# Patient Record
Sex: MALE | Race: BLACK OR AFRICAN AMERICAN | NOT HISPANIC OR LATINO | Employment: UNEMPLOYED | URBAN - METROPOLITAN AREA
[De-identification: names, ages, dates, MRNs, and addresses within clinical notes are randomized per-mention and may not be internally consistent; named-entity substitution may affect disease eponyms.]

---

## 2017-05-16 ENCOUNTER — HISTORICAL (OUTPATIENT)
Dept: RADIOLOGY | Facility: HOSPITAL | Age: 47
End: 2017-05-16

## 2017-06-08 ENCOUNTER — HISTORICAL (OUTPATIENT)
Dept: LAB | Facility: HOSPITAL | Age: 47
End: 2017-06-08

## 2017-06-08 LAB
ABS NEUT (OLG): 3.55 X10(3)/MCL (ref 2.1–9.2)
ALBUMIN SERPL-MCNC: 4.1 GM/DL (ref 3.4–5)
ALBUMIN/GLOB SERPL: 1 RATIO (ref 1–2)
ALP SERPL-CCNC: 55 UNIT/L (ref 20–120)
ALT SERPL-CCNC: 12 UNIT/L
AST SERPL-CCNC: 20 UNIT/L
BASOPHILS # BLD AUTO: 0.02 X10(3)/MCL
BASOPHILS NFR BLD AUTO: 0 % (ref 0–1)
BILIRUB SERPL-MCNC: 0.7 MG/DL
BILIRUBIN DIRECT+TOT PNL SERPL-MCNC: 0.2 MG/DL
BILIRUBIN DIRECT+TOT PNL SERPL-MCNC: 0.5 MG/DL
BUN SERPL-MCNC: 17 MG/DL (ref 7–25)
CALCIUM SERPL-MCNC: 9.1 MG/DL (ref 8.4–10.3)
CD3+CD4+ CELLS # SPEC: 625 UNIT/L (ref 589–1505)
CD3+CD4+ CELLS NFR BLD: 26.3 % (ref 31–59)
CHLORIDE SERPL-SCNC: 102 MMOL/L (ref 96–110)
CO2 SERPL-SCNC: 30 MMOL/L (ref 24–32)
CREAT SERPL-MCNC: 0.96 MG/DL (ref 0.7–1.4)
EOSINOPHIL # BLD AUTO: 0.15 10*3/UL
EOSINOPHIL NFR BLD AUTO: 2 % (ref 0–5)
ERYTHROCYTE [DISTWIDTH] IN BLOOD BY AUTOMATED COUNT: 13 % (ref 11.5–14.5)
GLOBULIN SER-MCNC: 3.9 GM/ML (ref 2.3–3.5)
GLUCOSE SERPL-MCNC: 88 MG/DL (ref 65–99)
HCT VFR BLD AUTO: 36.2 % (ref 40–51)
HGB BLD-MCNC: 13.2 GM/DL (ref 13.5–17.5)
IMM GRANULOCYTES # BLD AUTO: 0.02 10*3/UL
IMM GRANULOCYTES NFR BLD AUTO: 0 %
LYMPHOCYTES # BLD AUTO: 2.35 X10(3)/MCL
LYMPHOCYTES # BLD AUTO: 2376 UNIT/L (ref 1260–5520)
LYMPHOCYTES NFR BLD AUTO: 36 % (ref 15–40)
LYMPHOCYTES NFR LN MANUAL: 36 % (ref 28–48)
LYMPHOMA - T-CELL MARKERS SPEC-IMP: ABNORMAL
MCH RBC QN AUTO: 40.5 PG (ref 26–34)
MCHC RBC AUTO-ENTMCNC: 36.5 GM/DL (ref 31–37)
MCV RBC AUTO: 111 FL (ref 80–100)
MONOCYTES # BLD AUTO: 0.5 X10(3)/MCL
MONOCYTES NFR BLD AUTO: 8 % (ref 4–12)
NEUTROPHILS # BLD AUTO: 3.55 X10(3)/MCL
NEUTROPHILS NFR BLD AUTO: 54 X10(3)/MCL
PLATELET # BLD AUTO: 199 X10(3)/MCL (ref 130–400)
PMV BLD AUTO: 10.2 FL (ref 7.4–10.4)
POTASSIUM SERPL-SCNC: 4.1 MMOL/L (ref 3.6–5.2)
PROT SERPL-MCNC: 8 GM/DL (ref 6–8)
RBC # BLD AUTO: 3.26 X10(6)/MCL (ref 4.5–5.9)
SODIUM SERPL-SCNC: 137 MMOL/L (ref 135–146)
WBC # BLD AUTO: 6600 /MM3 (ref 4500–11500)
WBC # SPEC AUTO: 6.6 X10(3)/MCL (ref 4.5–11)

## 2017-09-15 ENCOUNTER — HISTORICAL (OUTPATIENT)
Dept: ADMINISTRATIVE | Facility: HOSPITAL | Age: 47
End: 2017-09-15

## 2017-09-15 LAB
ABS NEUT (OLG): 3.01 X10(3)/MCL (ref 2.1–9.2)
ALBUMIN SERPL-MCNC: 3.9 GM/DL (ref 3.4–5)
ALBUMIN/GLOB SERPL: 1 RATIO (ref 1–2)
ALP SERPL-CCNC: 59 UNIT/L (ref 45–117)
ALT SERPL-CCNC: 14 UNIT/L (ref 12–78)
AST SERPL-CCNC: 16 UNIT/L (ref 15–37)
BASOPHILS # BLD AUTO: 0.03 X10(3)/MCL
BASOPHILS NFR BLD AUTO: 0 % (ref 0–1)
BILIRUB SERPL-MCNC: 0.5 MG/DL (ref 0.2–1)
BILIRUBIN DIRECT+TOT PNL SERPL-MCNC: 0.1 MG/DL
BILIRUBIN DIRECT+TOT PNL SERPL-MCNC: 0.4 MG/DL
BUN SERPL-MCNC: 10 MG/DL (ref 7–18)
CALCIUM SERPL-MCNC: 9.1 MG/DL (ref 8.5–10.1)
CD3+CD4+ CELLS # SPEC: ABNORMAL UNIT/L (ref 589–1505)
CD3+CD4+ CELLS NFR BLD: 29 % (ref 31–59)
CHLORIDE SERPL-SCNC: 101 MMOL/L (ref 98–107)
CO2 SERPL-SCNC: 31 MMOL/L (ref 21–32)
CREAT SERPL-MCNC: 1.1 MG/DL (ref 0.6–1.3)
EOSINOPHIL # BLD AUTO: 0.18 X10(3)/MCL
EOSINOPHIL NFR BLD AUTO: 3 % (ref 0–5)
ERYTHROCYTE [DISTWIDTH] IN BLOOD BY AUTOMATED COUNT: 11.9 % (ref 11.5–14.5)
GLOBULIN SER-MCNC: 4.3 GM/ML (ref 2.3–3.5)
GLUCOSE SERPL-MCNC: 83 MG/DL (ref 74–106)
HCT VFR BLD AUTO: 35.7 % (ref 40–51)
HGB BLD-MCNC: 13.1 GM/DL (ref 13.5–17.5)
LYMPHOCYTES # BLD AUTO: 2.46 X10(3)/MCL
LYMPHOCYTES # BLD AUTO: 2480 UNIT/L (ref 1260–5520)
LYMPHOCYTES NFR BLD AUTO: 40 % (ref 15–40)
LYMPHOCYTES NFR LN MANUAL: 40 % (ref 28–48)
LYMPHOMA - T-CELL MARKERS SPEC-IMP: ABNORMAL
MCH RBC QN AUTO: 40.9 PG (ref 26–34)
MCHC RBC AUTO-ENTMCNC: 36.7 GM/DL (ref 31–37)
MCV RBC AUTO: 111.6 FL (ref 80–100)
MONOCYTES # BLD AUTO: 0.55 X10(3)/MCL
MONOCYTES NFR BLD AUTO: 9 % (ref 4–12)
NEUTROPHILS # BLD AUTO: 3.01 X10(3)/MCL
NEUTROPHILS NFR BLD AUTO: 48 X10(3)/MCL
PLATELET # BLD AUTO: 199 X10(3)/MCL (ref 130–400)
PMV BLD AUTO: 10.4 FL (ref 7.4–10.4)
POTASSIUM SERPL-SCNC: 4.3 MMOL/L (ref 3.5–5.1)
PROT SERPL-MCNC: 8.2 GM/DL (ref 6.4–8.2)
RBC # BLD AUTO: 3.2 X10(6)/MCL (ref 4.5–5.9)
SODIUM SERPL-SCNC: 139 MMOL/L (ref 136–145)
WBC # BLD AUTO: 6200 /MM3 (ref 4500–11500)
WBC # SPEC AUTO: 6.2 X10(3)/MCL (ref 4.5–11)

## 2017-10-10 ENCOUNTER — HISTORICAL (OUTPATIENT)
Dept: ADMINISTRATIVE | Facility: HOSPITAL | Age: 47
End: 2017-10-10

## 2017-10-10 LAB
CHOLEST SERPL-MCNC: 168 MG/DL
CHOLEST/HDLC SERPL: 2.6 {RATIO} (ref 0–5)
HDLC SERPL-MCNC: 64 MG/DL
LDLC SERPL CALC-MCNC: 68 MG/DL (ref 0–130)
TRIGL SERPL-MCNC: 181 MG/DL
VLDLC SERPL CALC-MCNC: 36 MG/DL

## 2018-03-01 ENCOUNTER — HISTORICAL (OUTPATIENT)
Dept: ADMINISTRATIVE | Facility: HOSPITAL | Age: 48
End: 2018-03-01

## 2018-03-01 LAB
ABS NEUT (OLG): 2.42 X10(3)/MCL (ref 2.1–9.2)
ABS NEUT (OLG): 2.48 X10(3)/MCL (ref 2.1–9.2)
ALBUMIN SERPL-MCNC: 3.8 GM/DL (ref 3.4–5)
ALBUMIN/GLOB SERPL: 1 RATIO (ref 1–2)
ALP SERPL-CCNC: 55 UNIT/L (ref 45–117)
ALT SERPL-CCNC: 16 UNIT/L (ref 12–78)
APPEARANCE, UA: CLEAR
AST SERPL-CCNC: 17 UNIT/L (ref 15–37)
BACTERIA #/AREA URNS AUTO: ABNORMAL /[HPF]
BASOPHILS # BLD AUTO: 0.01 X10(3)/MCL
BASOPHILS # BLD AUTO: 0.02 X10(3)/MCL
BASOPHILS NFR BLD AUTO: 0 %
BASOPHILS NFR BLD AUTO: 0 %
BILIRUB SERPL-MCNC: 0.4 MG/DL (ref 0.2–1)
BILIRUB UR QL STRIP: NEGATIVE
BILIRUBIN DIRECT+TOT PNL SERPL-MCNC: 0.1 MG/DL
BILIRUBIN DIRECT+TOT PNL SERPL-MCNC: 0.3 MG/DL
BUN SERPL-MCNC: 15 MG/DL (ref 7–18)
CALCIUM SERPL-MCNC: 9.1 MG/DL (ref 8.5–10.1)
CD3+CD4+ CELLS # SPEC: 570 UNIT/L (ref 589–1505)
CD3+CD4+ CELLS NFR BLD: 28.5 % (ref 31–59)
CHLORIDE SERPL-SCNC: 104 MMOL/L (ref 98–107)
CHOLEST SERPL-MCNC: 167 MG/DL
CHOLEST/HDLC SERPL: 2.3 {RATIO} (ref 0–5)
CO2 SERPL-SCNC: 30 MMOL/L (ref 21–32)
COLOR UR: YELLOW
CREAT SERPL-MCNC: 1.2 MG/DL (ref 0.6–1.3)
DEPRECATED CALCIDIOL+CALCIFEROL SERPL-MC: 85.07 NG/ML (ref 30–80)
EOSINOPHIL # BLD AUTO: 0.12 10*3/UL
EOSINOPHIL # BLD AUTO: 0.12 X10(3)/MCL
EOSINOPHIL NFR BLD AUTO: 2 %
EOSINOPHIL NFR BLD AUTO: 2 %
ERYTHROCYTE [DISTWIDTH] IN BLOOD BY AUTOMATED COUNT: 13 % (ref 11.5–14.5)
ERYTHROCYTE [DISTWIDTH] IN BLOOD BY AUTOMATED COUNT: 13 % (ref 11.5–14.5)
GLOBULIN SER-MCNC: 4.3 GM/ML (ref 2.3–3.5)
GLUCOSE (UA): NORMAL
GLUCOSE SERPL-MCNC: 87 MG/DL (ref 74–106)
HCT VFR BLD AUTO: 34.5 % (ref 40–51)
HCT VFR BLD AUTO: 34.9 % (ref 40–51)
HDLC SERPL-MCNC: 73 MG/DL
HGB BLD-MCNC: 12.8 GM/DL (ref 13.5–17.5)
HGB BLD-MCNC: 12.9 GM/DL (ref 13.5–17.5)
HGB UR QL STRIP: NEGATIVE
HYALINE CASTS #/AREA URNS LPF: ABNORMAL /[LPF]
IMM GRANULOCYTES # BLD AUTO: 0.01 10*3/UL
IMM GRANULOCYTES # BLD AUTO: 0.01 10*3/UL
IMM GRANULOCYTES NFR BLD AUTO: 0 %
IMM GRANULOCYTES NFR BLD AUTO: 0 %
KETONES UR QL STRIP: NEGATIVE
LDLC SERPL CALC-MCNC: 76 MG/DL (ref 0–130)
LEUKOCYTE ESTERASE UR QL STRIP: NEGATIVE
LYMPHOCYTES # BLD AUTO: 1.98 X10(3)/MCL
LYMPHOCYTES # BLD AUTO: 2.04 X10(3)/MCL
LYMPHOCYTES # BLD AUTO: 2000 UNIT/L (ref 1260–5520)
LYMPHOCYTES NFR BLD AUTO: 40 % (ref 13–40)
LYMPHOCYTES NFR BLD AUTO: 40 % (ref 13–40)
LYMPHOCYTES NFR LN MANUAL: 40 % (ref 28–48)
LYMPHOMA - T-CELL MARKERS SPEC-IMP: ABNORMAL
MCH RBC QN AUTO: 42.5 PG (ref 26–34)
MCH RBC QN AUTO: 43.4 PG (ref 26–34)
MCHC RBC AUTO-ENTMCNC: 36.7 GM/DL (ref 31–37)
MCHC RBC AUTO-ENTMCNC: 37.4 GM/DL (ref 31–37)
MCV RBC AUTO: 115.9 FL (ref 80–100)
MCV RBC AUTO: 116.2 FL (ref 80–100)
MONOCYTES # BLD AUTO: 0.41 X10(3)/MCL
MONOCYTES # BLD AUTO: 0.43 X10(3)/MCL
MONOCYTES NFR BLD AUTO: 8 % (ref 4–12)
MONOCYTES NFR BLD AUTO: 9 % (ref 4–12)
NEG CONT SPOT COUNT: 0
NEUTROPHILS # BLD AUTO: 2.42 X10(3)/MCL
NEUTROPHILS # BLD AUTO: 2.48 X10(3)/MCL
NEUTROPHILS NFR BLD AUTO: 49 X10(3)/MCL
NEUTROPHILS NFR BLD AUTO: 49 X10(3)/MCL
NITRITE UR QL STRIP: NEGATIVE
PANEL A SPOT COUNT: 0
PANEL B SPOT COUNT: 0
PH UR STRIP: 7 [PH] (ref 4.5–8)
PLATELET # BLD AUTO: 199 X10(3)/MCL (ref 130–400)
PLATELET # BLD AUTO: 205 X10(3)/MCL (ref 130–400)
PMV BLD AUTO: 10.5 FL (ref 7.4–10.4)
PMV BLD AUTO: 10.5 FL (ref 7.4–10.4)
POS CONT SPOT COUNT: >20
POTASSIUM SERPL-SCNC: 4.2 MMOL/L (ref 3.5–5.1)
PROT SERPL-MCNC: 8.1 GM/DL (ref 6.4–8.2)
PROT UR QL STRIP: NEGATIVE
RBC # BLD AUTO: 2.97 X10(6)/MCL (ref 4.5–5.9)
RBC # BLD AUTO: 3.01 X10(6)/MCL (ref 4.5–5.9)
RBC #/AREA URNS AUTO: ABNORMAL /[HPF]
RPR SER QL: NORMAL
SODIUM SERPL-SCNC: 139 MMOL/L (ref 136–145)
SP GR UR STRIP: 1.01 (ref 1–1.03)
SQUAMOUS #/AREA URNS LPF: ABNORMAL /[LPF]
T PALLIDUM AB SER QL: REACTIVE
T-SPOT.TB: NEGATIVE
TRIGL SERPL-MCNC: 90 MG/DL
TSH SERPL-ACNC: 1.86 MIU/L (ref 0.36–3.74)
UROBILINOGEN UR STRIP-ACNC: NORMAL
VLDLC SERPL CALC-MCNC: 18 MG/DL
WBC # BLD AUTO: 5000 /MM3 (ref 4500–11500)
WBC # SPEC AUTO: 5 X10(3)/MCL (ref 4.5–11)
WBC # SPEC AUTO: 5.1 X10(3)/MCL (ref 4.5–11)
WBC #/AREA URNS AUTO: ABNORMAL /HPF

## 2018-10-03 ENCOUNTER — HISTORICAL (OUTPATIENT)
Dept: ADMINISTRATIVE | Facility: HOSPITAL | Age: 48
End: 2018-10-03

## 2018-10-03 LAB
ABS NEUT (OLG): 2.21 X10(3)/MCL
ALBUMIN SERPL-MCNC: 3.8 GM/DL (ref 3.4–5)
ALBUMIN/GLOB SERPL: 1 RATIO (ref 1–2)
ALP SERPL-CCNC: 63 UNIT/L (ref 45–117)
ALT SERPL-CCNC: 15 UNIT/L (ref 12–78)
AST SERPL-CCNC: 17 UNIT/L (ref 15–37)
BASOPHILS # BLD AUTO: 0.02 X10(3)/MCL
BASOPHILS NFR BLD AUTO: 0 %
BILIRUB SERPL-MCNC: 0.5 MG/DL (ref 0.2–1)
BILIRUBIN DIRECT+TOT PNL SERPL-MCNC: 0.1 MG/DL
BILIRUBIN DIRECT+TOT PNL SERPL-MCNC: 0.4 MG/DL
BUN SERPL-MCNC: 13 MG/DL (ref 7–18)
CALCIUM SERPL-MCNC: 9 MG/DL (ref 8.5–10.1)
CD3+CD4+ CELLS # SPEC: 559 UNIT/L (ref 589–1505)
CD3+CD4+ CELLS NFR BLD: 26.6 % (ref 31–59)
CHLORIDE SERPL-SCNC: 107 MMOL/L (ref 98–107)
CO2 SERPL-SCNC: 26 MMOL/L (ref 21–32)
CREAT SERPL-MCNC: 1.3 MG/DL (ref 0.6–1.3)
EOSINOPHIL # BLD AUTO: 0.22 X10(3)/MCL
EOSINOPHIL NFR BLD AUTO: 4 %
ERYTHROCYTE [DISTWIDTH] IN BLOOD BY AUTOMATED COUNT: 12.2 % (ref 11.5–14.5)
GLOBULIN SER-MCNC: 4.6 GM/ML (ref 2.3–3.5)
GLUCOSE SERPL-MCNC: 98 MG/DL (ref 74–106)
HCT VFR BLD AUTO: 35.6 % (ref 40–51)
HGB BLD-MCNC: 13.3 GM/DL (ref 13.5–17.5)
IMM GRANULOCYTES # BLD AUTO: 0.01 10*3/UL
IMM GRANULOCYTES NFR BLD AUTO: 0 %
LYMPHOCYTES # BLD AUTO: 2.1 X10(3)/MCL
LYMPHOCYTES # BLD AUTO: 2100 UNIT/L (ref 1260–5520)
LYMPHOCYTES NFR BLD AUTO: 42 % (ref 13–40)
LYMPHOCYTES NFR LN MANUAL: 42 % (ref 28–48)
LYMPHOMA - T-CELL MARKERS SPEC-IMP: ABNORMAL
MACROCYTES BLD QL SMEAR: NORMAL
MCH RBC QN AUTO: 42.2 PG (ref 26–34)
MCHC RBC AUTO-ENTMCNC: 37.4 GM/DL (ref 31–37)
MCV RBC AUTO: 113 FL (ref 80–100)
MONOCYTES # BLD AUTO: 0.42 X10(3)/MCL
MONOCYTES NFR BLD AUTO: 8 % (ref 4–12)
NEUTROPHILS # BLD AUTO: 2.21 X10(3)/MCL
NEUTROPHILS NFR BLD AUTO: 44 %
PLATELET # BLD AUTO: 205 X10(3)/MCL (ref 130–400)
PLATELET # BLD EST: ADEQUATE 10*3/UL
PMV BLD AUTO: 10 FL (ref 7.4–10.4)
POTASSIUM SERPL-SCNC: 3.7 MMOL/L (ref 3.5–5.1)
PROT SERPL-MCNC: 8.4 GM/DL (ref 6.4–8.2)
RBC # BLD AUTO: 3.15 X10(6)/MCL (ref 4.5–5.9)
RBC MORPH BLD: NORMAL
SODIUM SERPL-SCNC: 139 MMOL/L (ref 136–145)
SPHEROCYTES BLD QL SMEAR: NORMAL
TARGETS BLD QL SMEAR: NORMAL
WBC # BLD AUTO: 5000 /MM3 (ref 4500–11500)
WBC # SPEC AUTO: 5 X10(3)/MCL (ref 4.5–11)

## 2018-11-01 ENCOUNTER — HISTORICAL (OUTPATIENT)
Dept: ADMINISTRATIVE | Facility: HOSPITAL | Age: 48
End: 2018-11-01

## 2018-11-01 LAB
ABS NEUT (OLG): 4.22 X10(3)/MCL (ref 2.1–9.2)
ALBUMIN SERPL-MCNC: 3.6 GM/DL (ref 3.4–5)
ALBUMIN/GLOB SERPL: 1 RATIO (ref 1–2)
ALP SERPL-CCNC: 69 UNIT/L (ref 45–117)
ALT SERPL-CCNC: 17 UNIT/L (ref 12–78)
AST SERPL-CCNC: 15 UNIT/L (ref 15–37)
BASOPHILS # BLD AUTO: 0.01 X10(3)/MCL
BASOPHILS NFR BLD AUTO: 0 %
BILIRUB SERPL-MCNC: 0.3 MG/DL (ref 0.2–1)
BILIRUBIN DIRECT+TOT PNL SERPL-MCNC: 0.1 MG/DL
BILIRUBIN DIRECT+TOT PNL SERPL-MCNC: 0.2 MG/DL
BUN SERPL-MCNC: 18 MG/DL (ref 7–18)
CALCIUM SERPL-MCNC: 8.7 MG/DL (ref 8.5–10.1)
CHLORIDE SERPL-SCNC: 106 MMOL/L (ref 98–107)
CK MB SERPL-MCNC: 2.3 NG/ML (ref 1–3.6)
CK SERPL-CCNC: 228 UNIT/L (ref 39–308)
CO2 SERPL-SCNC: 28 MMOL/L (ref 21–32)
CREAT SERPL-MCNC: 1.1 MG/DL (ref 0.6–1.3)
DEPRECATED CALCIDIOL+CALCIFEROL SERPL-MC: 25.76 NG/ML (ref 30–80)
EOSINOPHIL # BLD AUTO: 0.22 10*3/UL
EOSINOPHIL NFR BLD AUTO: 3 %
ERYTHROCYTE [DISTWIDTH] IN BLOOD BY AUTOMATED COUNT: 12 % (ref 11.5–14.5)
GLOBULIN SER-MCNC: 4.5 GM/ML (ref 2.3–3.5)
GLUCOSE SERPL-MCNC: 77 MG/DL (ref 74–106)
HCT VFR BLD AUTO: 33.9 % (ref 40–51)
HGB BLD-MCNC: 12.5 GM/DL (ref 13.5–17.5)
IMM GRANULOCYTES # BLD AUTO: 0.01 10*3/UL
IMM GRANULOCYTES NFR BLD AUTO: 0 %
LYMPHOCYTES # BLD AUTO: 2.11 X10(3)/MCL
LYMPHOCYTES NFR BLD AUTO: 30 % (ref 13–40)
MCH RBC QN AUTO: 42.2 PG (ref 26–34)
MCHC RBC AUTO-ENTMCNC: 36.9 GM/DL (ref 31–37)
MCV RBC AUTO: 114.5 FL (ref 80–100)
MONOCYTES # BLD AUTO: 0.55 X10(3)/MCL
MONOCYTES NFR BLD AUTO: 8 % (ref 4–12)
NEUTROPHILS # BLD AUTO: 4.22 X10(3)/MCL
NEUTROPHILS NFR BLD AUTO: 59 X10(3)/MCL
PLATELET # BLD AUTO: 201 X10(3)/MCL (ref 130–400)
PMV BLD AUTO: 10 FL (ref 7.4–10.4)
POTASSIUM SERPL-SCNC: 3.9 MMOL/L (ref 3.5–5.1)
PROT SERPL-MCNC: 8.1 GM/DL (ref 6.4–8.2)
RBC # BLD AUTO: 2.96 X10(6)/MCL (ref 4.5–5.9)
SODIUM SERPL-SCNC: 140 MMOL/L (ref 136–145)
TROPONIN I SERPL-MCNC: <0.015 NG/ML (ref 0–0.05)
WBC # SPEC AUTO: 7.1 X10(3)/MCL (ref 4.5–11)

## 2018-11-02 ENCOUNTER — HISTORICAL (OUTPATIENT)
Dept: ADMINISTRATIVE | Facility: HOSPITAL | Age: 48
End: 2018-11-02

## 2019-01-23 ENCOUNTER — HISTORICAL (OUTPATIENT)
Dept: RADIOLOGY | Facility: HOSPITAL | Age: 49
End: 2019-01-23

## 2019-01-28 ENCOUNTER — HISTORICAL (OUTPATIENT)
Dept: ADMINISTRATIVE | Facility: HOSPITAL | Age: 49
End: 2019-01-28

## 2019-01-28 LAB
ABS NEUT (OLG): 1.88 X10(3)/MCL (ref 2.1–9.2)
ALBUMIN SERPL-MCNC: 4 GM/DL (ref 3.4–5)
ALBUMIN/GLOB SERPL: 0.9 RATIO (ref 1.1–2)
ALP SERPL-CCNC: 80 UNIT/L (ref 45–117)
ALT SERPL-CCNC: 15 UNIT/L (ref 12–78)
AST SERPL-CCNC: 18 UNIT/L (ref 15–37)
BASOPHILS # BLD AUTO: 0.01 X10(3)/MCL
BASOPHILS NFR BLD AUTO: 0 %
BILIRUB SERPL-MCNC: 0.9 MG/DL (ref 0.2–1)
BILIRUBIN DIRECT+TOT PNL SERPL-MCNC: 0.2 MG/DL
BILIRUBIN DIRECT+TOT PNL SERPL-MCNC: 0.7 MG/DL
BUN SERPL-MCNC: 11 MG/DL (ref 7–18)
CALCIUM SERPL-MCNC: 8.9 MG/DL (ref 8.5–10.1)
CD3+CD4+ CELLS # SPEC: 676 UNIT/L (ref 589–1505)
CD3+CD4+ CELLS NFR BLD: 33.4 % (ref 31–59)
CHLORIDE SERPL-SCNC: 102 MMOL/L (ref 98–107)
CO2 SERPL-SCNC: 29 MMOL/L (ref 21–32)
CREAT SERPL-MCNC: 1.3 MG/DL (ref 0.6–1.3)
DEPRECATED CALCIDIOL+CALCIFEROL SERPL-MC: 22.75 NG/ML (ref 30–80)
EOSINOPHIL # BLD AUTO: 0.12 10*3/UL
EOSINOPHIL NFR BLD AUTO: 3 %
ERYTHROCYTE [DISTWIDTH] IN BLOOD BY AUTOMATED COUNT: 12.7 % (ref 11.5–14.5)
GLOBULIN SER-MCNC: 4.4 GM/ML (ref 2.3–3.5)
GLUCOSE SERPL-MCNC: 115 MG/DL (ref 74–106)
HCT VFR BLD AUTO: 37.8 % (ref 40–51)
HGB BLD-MCNC: 13.8 GM/DL (ref 13.5–17.5)
LYMPHOCYTES # BLD AUTO: 2.03 X10(3)/MCL
LYMPHOCYTES # BLD AUTO: 2024 UNIT/L (ref 1260–5520)
LYMPHOCYTES NFR BLD AUTO: 46 % (ref 13–40)
LYMPHOCYTES NFR LN MANUAL: 46 % (ref 28–48)
LYMPHOMA - T-CELL MARKERS SPEC-IMP: ABNORMAL
MCH RBC QN AUTO: 41.8 PG (ref 26–34)
MCHC RBC AUTO-ENTMCNC: 36.5 GM/DL (ref 31–37)
MCV RBC AUTO: 114.5 FL (ref 80–100)
MONOCYTES # BLD AUTO: 0.35 X10(3)/MCL
MONOCYTES NFR BLD AUTO: 8 % (ref 4–12)
NEUTROPHILS # BLD AUTO: 1.88 X10(3)/MCL
NEUTROPHILS NFR BLD AUTO: 43 X10(3)/MCL
PLATELET # BLD AUTO: 178 X10(3)/MCL (ref 130–400)
PMV BLD AUTO: 10.6 FL (ref 7.4–10.4)
POTASSIUM SERPL-SCNC: 4 MMOL/L (ref 3.5–5.1)
PROT SERPL-MCNC: 8.4 GM/DL (ref 6.4–8.2)
RBC # BLD AUTO: 3.3 X10(6)/MCL (ref 4.5–5.9)
SODIUM SERPL-SCNC: 135 MMOL/L (ref 136–145)
WBC # BLD AUTO: 4400 /MM3 (ref 4500–11500)
WBC # SPEC AUTO: 4.4 X10(3)/MCL (ref 4.5–11)

## 2019-02-12 ENCOUNTER — HISTORICAL (OUTPATIENT)
Dept: RADIOLOGY | Facility: HOSPITAL | Age: 49
End: 2019-02-12

## 2019-04-30 ENCOUNTER — HISTORICAL (OUTPATIENT)
Dept: CARDIOLOGY | Facility: HOSPITAL | Age: 49
End: 2019-04-30

## 2019-05-29 ENCOUNTER — HISTORICAL (OUTPATIENT)
Dept: ADMINISTRATIVE | Facility: HOSPITAL | Age: 49
End: 2019-05-29

## 2019-05-29 LAB
ABS NEUT (OLG): 2.92 X10(3)/MCL (ref 2.1–9.2)
ALBUMIN SERPL-MCNC: 3.8 GM/DL (ref 3.4–5)
ALBUMIN/GLOB SERPL: 0.9 RATIO (ref 1.1–2)
ALP SERPL-CCNC: 71 UNIT/L (ref 45–117)
ALT SERPL-CCNC: 14 UNIT/L (ref 12–78)
AST SERPL-CCNC: 19 UNIT/L (ref 15–37)
BASOPHILS # BLD AUTO: 0.03 X10(3)/MCL
BASOPHILS NFR BLD AUTO: 1 %
BILIRUB SERPL-MCNC: 0.5 MG/DL (ref 0.2–1)
BILIRUBIN DIRECT+TOT PNL SERPL-MCNC: 0.2 MG/DL
BILIRUBIN DIRECT+TOT PNL SERPL-MCNC: 0.3 MG/DL
BUN SERPL-MCNC: 10 MG/DL (ref 7–18)
CALCIUM SERPL-MCNC: 9 MG/DL (ref 8.5–10.1)
CD3+CD4+ CELLS # SPEC: 418 UNIT/L (ref 589–1505)
CD3+CD4+ CELLS NFR BLD: 34.1 % (ref 31–59)
CHLORIDE SERPL-SCNC: 109 MMOL/L (ref 98–107)
CHOLEST SERPL-MCNC: 168 MG/DL
CHOLEST/HDLC SERPL: 2.7 {RATIO} (ref 0–5)
CO2 SERPL-SCNC: 28 MMOL/L (ref 21–32)
CREAT SERPL-MCNC: 1.2 MG/DL (ref 0.6–1.3)
CRP SERPL-MCNC: <0.3 MG/DL
DEPRECATED CALCIDIOL+CALCIFEROL SERPL-MC: 73.28 NG/ML (ref 30–80)
EOSINOPHIL # BLD AUTO: 0.31 X10(3)/MCL
EOSINOPHIL NFR BLD AUTO: 6 %
ERYTHROCYTE [DISTWIDTH] IN BLOOD BY AUTOMATED COUNT: 12.1 % (ref 11.5–14.5)
ERYTHROCYTE [SEDIMENTATION RATE] IN BLOOD: 13 MM/HR (ref 0–15)
EST. AVERAGE GLUCOSE BLD GHB EST-MCNC: 91 MG/DL
GLOBULIN SER-MCNC: 4.2 GM/ML (ref 2.3–3.5)
GLUCOSE SERPL-MCNC: 67 MG/DL (ref 74–106)
HBA1C MFR BLD: 4.8 % (ref 4.2–6.3)
HCT VFR BLD AUTO: 35.3 % (ref 40–51)
HCV AB SERPL QL IA: NONREACTIVE
HDLC SERPL-MCNC: 63 MG/DL
HGB BLD-MCNC: 13.2 GM/DL (ref 13.5–17.5)
IMM GRANULOCYTES # BLD AUTO: 0.01 10*3/UL
IMM GRANULOCYTES NFR BLD AUTO: 0 %
LDLC SERPL CALC-MCNC: 72 MG/DL (ref 0–130)
LYMPHOCYTES # BLD AUTO: 1.21 X10(3)/MCL
LYMPHOCYTES # BLD AUTO: 1225 UNIT/L (ref 1260–5520)
LYMPHOCYTES NFR BLD AUTO: 25 % (ref 13–40)
LYMPHOCYTES NFR LN MANUAL: 25 % (ref 28–48)
LYMPHOMA - T-CELL MARKERS SPEC-IMP: ABNORMAL
MCH RBC QN AUTO: 43.4 PG (ref 26–34)
MCHC RBC AUTO-ENTMCNC: 37.4 GM/DL (ref 31–37)
MCV RBC AUTO: 116.1 FL (ref 80–100)
MONOCYTES # BLD AUTO: 0.4 X10(3)/MCL
MONOCYTES NFR BLD AUTO: 8 % (ref 4–12)
NEG CONT SPOT COUNT: NORMAL
NEUTROPHILS # BLD AUTO: 2.92 X10(3)/MCL
NEUTROPHILS NFR BLD AUTO: 60 X10(3)/MCL
PANEL A SPOT COUNT: 0
PANEL B SPOT COUNT: 0
PLATELET # BLD AUTO: 181 X10(3)/MCL (ref 130–400)
PMV BLD AUTO: 10.5 FL (ref 7.4–10.4)
POS CONT SPOT COUNT: NORMAL
POTASSIUM SERPL-SCNC: 4 MMOL/L (ref 3.5–5.1)
PROT SERPL-MCNC: 8 GM/DL (ref 6.4–8.2)
RBC # BLD AUTO: 3.04 X10(6)/MCL (ref 4.5–5.9)
RPR SER QL: NORMAL
SODIUM SERPL-SCNC: 140 MMOL/L (ref 136–145)
T PALLIDUM AB SER QL: REACTIVE
T-SPOT.TB: NORMAL
TRIGL SERPL-MCNC: 167 MG/DL
TSH SERPL-ACNC: 2.09 MIU/L (ref 0.36–3.74)
VLDLC SERPL CALC-MCNC: 33 MG/DL
WBC # BLD AUTO: 4900 /MM3 (ref 4500–11500)
WBC # SPEC AUTO: 4.9 X10(3)/MCL (ref 4.5–11)

## 2019-05-31 LAB
FINAL CULTURE: NORMAL
RAPID GROUP A STREP (OHS): NORMAL

## 2019-10-29 ENCOUNTER — HISTORICAL (OUTPATIENT)
Dept: ADMINISTRATIVE | Facility: HOSPITAL | Age: 49
End: 2019-10-29

## 2019-10-29 LAB
ABS NEUT (OLG): 1.81 X10(3)/MCL (ref 2.1–9.2)
ALBUMIN SERPL-MCNC: 3.8 GM/DL (ref 3.4–5)
ALBUMIN/GLOB SERPL: 0.9 RATIO (ref 1.1–2)
ALP SERPL-CCNC: 54 UNIT/L (ref 45–117)
ALT SERPL-CCNC: 18 UNIT/L (ref 12–78)
ANISOCYTOSIS BLD QL SMEAR: NORMAL
AST SERPL-CCNC: 23 UNIT/L (ref 15–37)
BASOPHILS # BLD AUTO: 0 X10(3)/MCL (ref 0–0.2)
BASOPHILS NFR BLD AUTO: 1 %
BILIRUB SERPL-MCNC: 0.7 MG/DL (ref 0.2–1)
BILIRUBIN DIRECT+TOT PNL SERPL-MCNC: 0.2 MG/DL (ref 0–0.2)
BILIRUBIN DIRECT+TOT PNL SERPL-MCNC: 0.5 MG/DL
BUN SERPL-MCNC: 13 MG/DL (ref 7–18)
CALCIUM SERPL-MCNC: 8.8 MG/DL (ref 8.5–10.1)
CD3+CD4+ CELLS # SPEC: 612 UNIT/L (ref 589–1505)
CD3+CD4+ CELLS NFR BLD: 36.4 % (ref 31–59)
CHLORIDE SERPL-SCNC: 106 MMOL/L (ref 98–107)
CO2 SERPL-SCNC: 29 MMOL/L (ref 21–32)
CREAT SERPL-MCNC: 1.2 MG/DL (ref 0.6–1.3)
EOSINOPHIL # BLD AUTO: 0.2 X10(3)/MCL (ref 0–0.9)
EOSINOPHIL NFR BLD AUTO: 5 %
ERYTHROCYTE [DISTWIDTH] IN BLOOD BY AUTOMATED COUNT: 13 % (ref 11.5–14.5)
GLOBULIN SER-MCNC: 4.1 GM/ML (ref 2.3–3.5)
GLUCOSE SERPL-MCNC: 72 MG/DL (ref 74–106)
HCT VFR BLD AUTO: 32.3 % (ref 40–51)
HGB BLD-MCNC: 11.9 GM/DL (ref 13.5–17.5)
LYMPHOCYTES # BLD AUTO: 1.7 X10(3)/MCL (ref 0.6–4.6)
LYMPHOCYTES # BLD AUTO: 1681 UNIT/L (ref 1260–5520)
LYMPHOCYTES NFR BLD AUTO: 41 %
LYMPHOCYTES NFR LN MANUAL: 41 % (ref 28–48)
LYMPHOMA - T-CELL MARKERS SPEC-IMP: ABNORMAL
MACROCYTES BLD QL SMEAR: NORMAL
MCH RBC QN AUTO: 44.4 PG (ref 26–34)
MCHC RBC AUTO-ENTMCNC: 36.8 GM/DL (ref 31–37)
MCV RBC AUTO: 120.5 FL (ref 80–100)
MONOCYTES # BLD AUTO: 0.4 X10(3)/MCL (ref 0.1–1.3)
MONOCYTES NFR BLD AUTO: 9 %
NEUTROPHILS # BLD AUTO: 1.81 X10(3)/MCL (ref 2.1–9.2)
NEUTROPHILS NFR BLD AUTO: 44 %
PLATELET # BLD AUTO: 199 X10(3)/MCL (ref 130–400)
PLATELET # BLD EST: ADEQUATE 10*3/UL
PMV BLD AUTO: 10.4 FL (ref 7.4–10.4)
POIKILOCYTOSIS BLD QL SMEAR: NORMAL
POTASSIUM SERPL-SCNC: 3.5 MMOL/L (ref 3.5–5.1)
PROT SERPL-MCNC: 7.9 GM/DL (ref 6.4–8.2)
RBC # BLD AUTO: 2.68 X10(6)/MCL (ref 4.5–5.9)
RBC MORPH BLD: NORMAL
SODIUM SERPL-SCNC: 140 MMOL/L (ref 136–145)
WBC # BLD AUTO: 4100 /MM3 (ref 4500–11500)
WBC # SPEC AUTO: 4.1 X10(3)/MCL (ref 4.5–11)

## 2020-02-28 ENCOUNTER — HISTORICAL (OUTPATIENT)
Dept: ADMINISTRATIVE | Facility: HOSPITAL | Age: 50
End: 2020-02-28

## 2020-02-28 LAB
ABS NEUT (OLG): 2.16 X10(3)/MCL (ref 2.1–9.2)
ALBUMIN SERPL-MCNC: 3.9 GM/DL (ref 3.4–5)
ALBUMIN/GLOB SERPL: 0.9 RATIO (ref 1.1–2)
ALP SERPL-CCNC: 65 UNIT/L (ref 45–117)
ALT SERPL-CCNC: 21 UNIT/L (ref 12–78)
AST SERPL-CCNC: 19 UNIT/L (ref 15–37)
BASOPHILS # BLD AUTO: 0 X10(3)/MCL (ref 0–0.2)
BASOPHILS NFR BLD AUTO: 0 %
BILIRUB SERPL-MCNC: 0.6 MG/DL (ref 0.2–1)
BILIRUBIN DIRECT+TOT PNL SERPL-MCNC: 0.2 MG/DL (ref 0–0.2)
BILIRUBIN DIRECT+TOT PNL SERPL-MCNC: 0.4 MG/DL
BUN SERPL-MCNC: 15 MG/DL (ref 7–18)
CALCIUM SERPL-MCNC: 8.9 MG/DL (ref 8.5–10.1)
CD3+CD4+ CELLS # SPEC: 662 UNIT/L (ref 589–1505)
CD3+CD4+ CELLS NFR BLD: 35 % (ref 31–59)
CHLORIDE SERPL-SCNC: 107 MMOL/L (ref 98–107)
CO2 SERPL-SCNC: 28 MMOL/L (ref 21–32)
CREAT SERPL-MCNC: 1.1 MG/DL (ref 0.6–1.3)
EOSINOPHIL # BLD AUTO: 0.1 X10(3)/MCL (ref 0–0.9)
EOSINOPHIL NFR BLD AUTO: 3 %
ERYTHROCYTE [DISTWIDTH] IN BLOOD BY AUTOMATED COUNT: 13.1 % (ref 11.5–14.5)
FERRITIN SERPL-MCNC: 156.4 NG/ML (ref 26–388)
GLOBULIN SER-MCNC: 4.4 GM/ML (ref 2.3–3.5)
GLUCOSE SERPL-MCNC: 57 MG/DL (ref 74–106)
HCT VFR BLD AUTO: 36.9 % (ref 40–51)
HGB BLD-MCNC: 13.3 GM/DL (ref 13.5–17.5)
IMM GRANULOCYTES # BLD AUTO: 0.01 10*3/UL
IMM GRANULOCYTES NFR BLD AUTO: 0 %
IRON SATN MFR SERPL: 35.5 % (ref 15–50)
IRON SERPL-MCNC: 113 MCG/DL (ref 65–175)
LYMPHOCYTES # BLD AUTO: 1.8 X10(3)/MCL (ref 0.6–4.6)
LYMPHOCYTES # BLD AUTO: 1892 UNIT/L (ref 1260–5520)
LYMPHOCYTES NFR BLD AUTO: 40 %
LYMPHOCYTES NFR LN MANUAL: 44 % (ref 28–48)
LYMPHOMA - T-CELL MARKERS SPEC-IMP: ABNORMAL
MCH RBC QN AUTO: 42.2 PG (ref 26–34)
MCHC RBC AUTO-ENTMCNC: 36 GM/DL (ref 31–37)
MCV RBC AUTO: 117.1 FL (ref 80–100)
MONOCYTES # BLD AUTO: 0.4 X10(3)/MCL (ref 0.1–1.3)
MONOCYTES NFR BLD AUTO: 8 %
NEUTROPHILS # BLD AUTO: 2.16 X10(3)/MCL (ref 2.1–9.2)
NEUTROPHILS NFR BLD AUTO: 49 %
PLATELET # BLD AUTO: 192 X10(3)/MCL (ref 130–400)
PLATELET # BLD EST: ADEQUATE 10*3/UL
PMV BLD AUTO: 10.6 FL (ref 7.4–10.4)
POTASSIUM SERPL-SCNC: 3.8 MMOL/L (ref 3.5–5.1)
PROT SERPL-MCNC: 8.3 GM/DL (ref 6.4–8.2)
RBC # BLD AUTO: 3.15 X10(6)/MCL (ref 4.5–5.9)
RBC MORPH BLD: NORMAL
RET# (OHS): 0.03
RETICULOCYTE COUNT AUTOMATED (OLG): 0.9 % (ref 0.5–1.5)
SODIUM SERPL-SCNC: 138 MMOL/L (ref 136–145)
TIBC SERPL-MCNC: 318 MCG/DL (ref 250–450)
TRANSFERRIN SERPL-MCNC: 280 MG/DL (ref 200–360)
WBC # BLD AUTO: 4300 /MM3 (ref 4500–11500)
WBC # SPEC AUTO: 4.4 X10(3)/MCL (ref 4.5–11)

## 2020-03-02 ENCOUNTER — HISTORICAL (OUTPATIENT)
Dept: ADMINISTRATIVE | Facility: HOSPITAL | Age: 50
End: 2020-03-02

## 2020-09-14 ENCOUNTER — HISTORICAL (OUTPATIENT)
Dept: ADMINISTRATIVE | Facility: HOSPITAL | Age: 50
End: 2020-09-14

## 2020-09-14 LAB
ABS NEUT (OLG): 1.78 X10(3)/MCL (ref 2.1–9.2)
ALBUMIN SERPL-MCNC: 3.8 GM/DL (ref 3.4–5)
ALBUMIN/GLOB SERPL: 0.9 RATIO (ref 1.1–2)
ALP SERPL-CCNC: 63 UNIT/L (ref 45–117)
ALT SERPL-CCNC: 21 UNIT/L (ref 12–78)
APPEARANCE, UA: CLEAR
AST SERPL-CCNC: 26 UNIT/L (ref 15–37)
BACTERIA #/AREA URNS AUTO: ABNORMAL /HPF
BASOPHILS # BLD AUTO: 0 X10(3)/MCL (ref 0–0.2)
BASOPHILS NFR BLD AUTO: 0 %
BILIRUB SERPL-MCNC: 0.4 MG/DL (ref 0.2–1)
BILIRUB UR QL STRIP: NEGATIVE
BILIRUBIN DIRECT+TOT PNL SERPL-MCNC: 0.1 MG/DL (ref 0–0.2)
BILIRUBIN DIRECT+TOT PNL SERPL-MCNC: 0.3 MG/DL
BUN SERPL-MCNC: 22 MG/DL (ref 7–18)
CALCIUM SERPL-MCNC: 8.3 MG/DL (ref 8.5–10.1)
CD3+CD4+ CELLS # SPEC: 660 UNIT/L (ref 589–1505)
CD3+CD4+ CELLS NFR BLD: 37.5 % (ref 31–59)
CHLORIDE SERPL-SCNC: 107 MMOL/L (ref 98–107)
CHOLEST SERPL-MCNC: 178 MG/DL
CHOLEST/HDLC SERPL: 2.1 {RATIO} (ref 0–5)
CO2 SERPL-SCNC: 26 MMOL/L (ref 21–32)
COLOR UR: YELLOW
CREAT SERPL-MCNC: 1 MG/DL (ref 0.6–1.3)
DEPRECATED CALCIDIOL+CALCIFEROL SERPL-MC: 62.7 NG/ML (ref 30–80)
EOSINOPHIL # BLD AUTO: 0.2 X10(3)/MCL (ref 0–0.9)
EOSINOPHIL NFR BLD AUTO: 5 %
ERYTHROCYTE [DISTWIDTH] IN BLOOD BY AUTOMATED COUNT: 13.2 % (ref 11.5–14.5)
GLOBULIN SER-MCNC: 4.4 GM/ML (ref 2.3–3.5)
GLUCOSE (UA): NEGATIVE
GLUCOSE SERPL-MCNC: 82 MG/DL (ref 74–106)
HCT VFR BLD AUTO: 34.9 % (ref 40–51)
HCV AB SERPL QL IA: NONREACTIVE
HDLC SERPL-MCNC: 83 MG/DL (ref 40–59)
HGB BLD-MCNC: 12.8 GM/DL (ref 13.5–17.5)
HGB UR QL STRIP: NEGATIVE
HYALINE CASTS #/AREA URNS LPF: ABNORMAL /LPF
KETONES UR QL STRIP: NEGATIVE
LDLC SERPL CALC-MCNC: 83 MG/DL
LEUKOCYTE ESTERASE UR QL STRIP: NEGATIVE
LYMPHOCYTES # BLD AUTO: 1.8 X10(3)/MCL (ref 0.6–4.6)
LYMPHOCYTES # BLD AUTO: 1760 UNIT/L (ref 1260–5520)
LYMPHOCYTES NFR BLD AUTO: 44 %
LYMPHOCYTES NFR LN MANUAL: 44 % (ref 28–48)
LYMPHOMA - T-CELL MARKERS SPEC-IMP: ABNORMAL
MCH RBC QN AUTO: 42.5 PG (ref 26–34)
MCHC RBC AUTO-ENTMCNC: 36.7 GM/DL (ref 31–37)
MCV RBC AUTO: 115.9 FL (ref 80–100)
MONOCYTES # BLD AUTO: 0.3 X10(3)/MCL (ref 0.1–1.3)
MONOCYTES NFR BLD AUTO: 7 %
NEG CONT SPOT COUNT: NORMAL
NEUTROPHILS # BLD AUTO: 1.78 X10(3)/MCL (ref 2.1–9.2)
NEUTROPHILS NFR BLD AUTO: 44 %
NITRITE UR QL STRIP: NEGATIVE
PANEL A SPOT COUNT: 0
PANEL B SPOT COUNT: 0
PH UR STRIP: 6 [PH] (ref 4.5–8)
PLATELET # BLD AUTO: 214 X10(3)/MCL (ref 130–400)
PMV BLD AUTO: 10 FL (ref 7.4–10.4)
POS CONT SPOT COUNT: NORMAL
POTASSIUM SERPL-SCNC: 3.9 MMOL/L (ref 3.5–5.1)
PROT SERPL-MCNC: 8.2 GM/DL (ref 6.4–8.2)
PROT UR QL STRIP: 50 MG/DL
PSA SERPL-MCNC: 2 NG/ML
RBC # BLD AUTO: 3.01 X10(6)/MCL (ref 4.5–5.9)
RBC #/AREA URNS AUTO: ABNORMAL /HPF
RPR SER QL: NORMAL
SODIUM SERPL-SCNC: 139 MMOL/L (ref 136–145)
SP GR UR STRIP: 1.03 (ref 1–1.03)
SQUAMOUS #/AREA URNS LPF: ABNORMAL /LPF
T PALLIDUM AB SER QL: REACTIVE
T-SPOT.TB: NORMAL
TRIGL SERPL-MCNC: 58 MG/DL
TSH SERPL-ACNC: 1.23 MIU/L (ref 0.36–3.74)
UROBILINOGEN UR STRIP-ACNC: 3 MG/DL
VLDLC SERPL CALC-MCNC: 12 MG/DL
WBC # BLD AUTO: 4000 /MM3 (ref 4500–11500)
WBC # SPEC AUTO: 4 X10(3)/MCL (ref 4.5–11)
WBC #/AREA URNS AUTO: ABNORMAL /HPF

## 2020-09-23 ENCOUNTER — HISTORICAL (OUTPATIENT)
Dept: ADMINISTRATIVE | Facility: HOSPITAL | Age: 50
End: 2020-09-23

## 2020-09-23 LAB
BUN SERPL-MCNC: 13 MG/DL (ref 7–18)
CALCIUM SERPL-MCNC: 8.4 MG/DL (ref 8.5–10.1)
CHLORIDE SERPL-SCNC: 109 MMOL/L (ref 98–107)
CO2 SERPL-SCNC: 25 MMOL/L (ref 21–32)
CREAT SERPL-MCNC: 1 MG/DL (ref 0.6–1.3)
CREAT/UREA NIT SERPL: 13 MG/DL (ref 12–14)
GLUCOSE SERPL-MCNC: 94 MG/DL (ref 74–106)
POTASSIUM SERPL-SCNC: 4.1 MMOL/L (ref 3.5–5.1)
SODIUM SERPL-SCNC: 139 MMOL/L (ref 136–145)

## 2021-01-19 ENCOUNTER — HISTORICAL (OUTPATIENT)
Dept: ADMINISTRATIVE | Facility: HOSPITAL | Age: 51
End: 2021-01-19

## 2021-01-19 LAB
ABS NEUT (OLG): 1.94 X10(3)/MCL (ref 2.1–9.2)
ALBUMIN SERPL-MCNC: 3.9 GM/DL (ref 3.5–5)
ALBUMIN/GLOB SERPL: 1 RATIO (ref 1.1–2)
ALP SERPL-CCNC: 67 UNIT/L (ref 40–150)
ALT SERPL-CCNC: 12 UNIT/L (ref 0–55)
AST SERPL-CCNC: 18 UNIT/L (ref 5–34)
BASOPHILS # BLD AUTO: 0 X10(3)/MCL (ref 0–0.2)
BASOPHILS NFR BLD AUTO: 1 %
BILIRUB SERPL-MCNC: 0.7 MG/DL
BILIRUBIN DIRECT+TOT PNL SERPL-MCNC: 0.3 MG/DL (ref 0–0.5)
BILIRUBIN DIRECT+TOT PNL SERPL-MCNC: 0.4 MG/DL (ref 0–0.8)
BUN SERPL-MCNC: 10 MG/DL (ref 8.4–25.7)
CALCIUM SERPL-MCNC: 9.4 MG/DL (ref 8.4–10.2)
CD3+CD4+ CELLS # SPEC: 821 UNIT/L (ref 589–1505)
CD3+CD4+ CELLS NFR BLD: 35.7 % (ref 31–59)
CHLORIDE SERPL-SCNC: 103 MMOL/L (ref 98–107)
CO2 SERPL-SCNC: 29 MMOL/L (ref 22–29)
CREAT SERPL-MCNC: 1.05 MG/DL (ref 0.73–1.18)
EOSINOPHIL # BLD AUTO: 0.2 X10(3)/MCL (ref 0–0.9)
EOSINOPHIL NFR BLD AUTO: 4 %
ERYTHROCYTE [DISTWIDTH] IN BLOOD BY AUTOMATED COUNT: 12.6 % (ref 11.5–14.5)
GLOBULIN SER-MCNC: 4 GM/DL (ref 2.4–3.5)
GLUCOSE SERPL-MCNC: 81 MG/DL (ref 74–100)
HCT VFR BLD AUTO: 38.8 % (ref 40–51)
HGB BLD-MCNC: 14.1 GM/DL (ref 13.5–17.5)
IMM GRANULOCYTES # BLD AUTO: 0.01 10*3/UL
IMM GRANULOCYTES NFR BLD AUTO: 0 %
LYMPHOCYTES # BLD AUTO: 2.3 X10(3)/MCL (ref 0.6–4.6)
LYMPHOCYTES # BLD AUTO: 2300 UNIT/L (ref 1260–5520)
LYMPHOCYTES NFR BLD AUTO: 46 %
LYMPHOCYTES NFR LN MANUAL: 46 % (ref 28–48)
LYMPHOMA - T-CELL MARKERS SPEC-IMP: NORMAL
MCH RBC QN AUTO: 42.6 PG (ref 26–34)
MCHC RBC AUTO-ENTMCNC: 36.3 GM/DL (ref 31–37)
MCV RBC AUTO: 117.2 FL (ref 80–100)
MONOCYTES # BLD AUTO: 0.5 X10(3)/MCL (ref 0.1–1.3)
MONOCYTES NFR BLD AUTO: 10 %
NEUTROPHILS # BLD AUTO: 1.94 X10(3)/MCL (ref 2.1–9.2)
NEUTROPHILS NFR BLD AUTO: 39 %
PLATELET # BLD AUTO: 235 X10(3)/MCL (ref 130–400)
PMV BLD AUTO: 10.4 FL (ref 7.4–10.4)
POTASSIUM SERPL-SCNC: 4.2 MMOL/L (ref 3.5–5.1)
PROT SERPL-MCNC: 7.9 GM/DL (ref 6.4–8.3)
RBC # BLD AUTO: 3.31 X10(6)/MCL (ref 4.5–5.9)
SODIUM SERPL-SCNC: 141 MMOL/L (ref 136–145)
WBC # BLD AUTO: 5000 /MM3 (ref 4500–11500)
WBC # SPEC AUTO: 5 X10(3)/MCL (ref 4.5–11)

## 2021-02-04 ENCOUNTER — HISTORICAL (OUTPATIENT)
Dept: RADIOLOGY | Facility: HOSPITAL | Age: 51
End: 2021-02-04

## 2021-04-14 ENCOUNTER — HISTORICAL (OUTPATIENT)
Dept: ADMINISTRATIVE | Facility: HOSPITAL | Age: 51
End: 2021-04-14

## 2021-04-14 LAB
ABS NEUT (OLG): 2.74 X10(3)/MCL (ref 2.1–9.2)
ALBUMIN SERPL-MCNC: 3.9 GM/DL (ref 3.5–5)
ALBUMIN/GLOB SERPL: 0.9 RATIO (ref 1.1–2)
ALP SERPL-CCNC: 94 UNIT/L (ref 40–150)
ALT SERPL-CCNC: 11 UNIT/L (ref 0–55)
AST SERPL-CCNC: 14 UNIT/L (ref 5–34)
BASOPHILS # BLD AUTO: 0 X10(3)/MCL (ref 0–0.2)
BASOPHILS NFR BLD AUTO: 0 %
BILIRUB SERPL-MCNC: 0.2 MG/DL
BILIRUBIN DIRECT+TOT PNL SERPL-MCNC: 0.1 MG/DL (ref 0–0.5)
BILIRUBIN DIRECT+TOT PNL SERPL-MCNC: 0.1 MG/DL (ref 0–0.8)
BUN SERPL-MCNC: 14.7 MG/DL (ref 8.4–25.7)
CALCIUM SERPL-MCNC: 9.1 MG/DL (ref 8.4–10.2)
CD3+CD4+ CELLS # SPEC: 792 UNIT/L (ref 589–1505)
CD3+CD4+ CELLS NFR BLD: 40.4 % (ref 31–59)
CHLORIDE SERPL-SCNC: 103 MMOL/L (ref 98–107)
CO2 SERPL-SCNC: 27 MMOL/L (ref 22–29)
CREAT SERPL-MCNC: 1.13 MG/DL (ref 0.73–1.18)
EOSINOPHIL # BLD AUTO: 0.2 X10(3)/MCL (ref 0–0.9)
EOSINOPHIL NFR BLD AUTO: 4 %
ERYTHROCYTE [DISTWIDTH] IN BLOOD BY AUTOMATED COUNT: 12.5 % (ref 11.5–14.5)
GLOBULIN SER-MCNC: 4.2 GM/DL (ref 2.4–3.5)
GLUCOSE SERPL-MCNC: 96 MG/DL (ref 74–100)
HCT VFR BLD AUTO: 35.4 % (ref 40–51)
HGB BLD-MCNC: 12.9 GM/DL (ref 13.5–17.5)
IMM GRANULOCYTES # BLD AUTO: 0.01 10*3/UL
IMM GRANULOCYTES NFR BLD AUTO: 0 %
LYMPHOCYTES # BLD AUTO: 1961 UNIT/L (ref 1260–5520)
LYMPHOCYTES # BLD AUTO: 2 X10(3)/MCL (ref 0.6–4.6)
LYMPHOCYTES NFR BLD AUTO: 37 %
LYMPHOCYTES NFR LN MANUAL: 37 % (ref 28–48)
LYMPHOMA - T-CELL MARKERS SPEC-IMP: NORMAL
MCH RBC QN AUTO: 42.4 PG (ref 26–34)
MCHC RBC AUTO-ENTMCNC: 36.4 GM/DL (ref 31–37)
MCV RBC AUTO: 116.4 FL (ref 80–100)
MONOCYTES # BLD AUTO: 0.3 X10(3)/MCL (ref 0.1–1.3)
MONOCYTES NFR BLD AUTO: 6 %
NEUTROPHILS # BLD AUTO: 2.74 X10(3)/MCL (ref 2.1–9.2)
NEUTROPHILS NFR BLD AUTO: 51 %
PLATELET # BLD AUTO: 225 X10(3)/MCL (ref 130–400)
PMV BLD AUTO: 10.1 FL (ref 7.4–10.4)
POTASSIUM SERPL-SCNC: 3.9 MMOL/L (ref 3.5–5.1)
PROT SERPL-MCNC: 8.1 GM/DL (ref 6.4–8.3)
RBC # BLD AUTO: 3.04 X10(6)/MCL (ref 4.5–5.9)
SODIUM SERPL-SCNC: 140 MMOL/L (ref 136–145)
WBC # BLD AUTO: 5300 /MM3 (ref 4500–11500)
WBC # SPEC AUTO: 5.3 X10(3)/MCL (ref 4.5–11)

## 2022-04-10 ENCOUNTER — HISTORICAL (OUTPATIENT)
Dept: ADMINISTRATIVE | Facility: HOSPITAL | Age: 52
End: 2022-04-10
Payer: MEDICAID

## 2022-04-26 VITALS
HEIGHT: 67 IN | WEIGHT: 144.81 LBS | SYSTOLIC BLOOD PRESSURE: 113 MMHG | DIASTOLIC BLOOD PRESSURE: 76 MMHG | BODY MASS INDEX: 22.73 KG/M2 | OXYGEN SATURATION: 99 %

## 2022-05-03 NOTE — HISTORICAL [facility] CERNER
This is a historical note converted from [facility]. Formatting and pictures may have been removed.  Please reference [facility] for original formatting and attached multimedia. Chief Complaint  b20 f/u  History of Present Illness  47 y/o?BM here for HIV f/u.??Dx in 1987. Pt reports 100% adherence to Combivir, Kaletra, and Isentress. Last HIV VL <20 and CD4 559 on 10/3/18.?Pt has been on this?regimen upwards of 5 years. Reviewed HIV resistance testing?with patient.? Pt would still need to remain on Combivir?1 po? BID so he is opting?to continuing current regimen as both he and his partner?Nakita?Siri are on similar regimens.? Pt denies fever, headaches, chills, visual problems, N/V/D,?chest or abdominal pain. Pt has bradycardia today with complaints of left neck/shoulder pain radiating down the left arm causing numbness and tingling to the digits.? Past MRI done 5/2017 was abnormal showing some disc bulging.? He states he has neck and left shoulder pain intermittently. He?was referred to [facility] neurosurgery for evaluation, however he never attended.?Pt will need a w/u today and he will need to be referred to both cardiology and neurosurgery for evaluation.? He is a?smoker. He smokes approx less than 1 ppd x >30 years and is not interested in quitting at present.??Pt did not attend eye appt. He will need a referral.??Pt will also need a referral to [facility] as he does not have a PCP.? I plan to order FIT and bone density scan. He is due for anal pap?smear along with STI screening.? I will collect as pt is amenable. Pt is also due for a flu and?meningitis vaccine.??He is amenable.?  Review of Systems  Constitutional:negative unless stated in HPI  Eye: negative unless stated in HPI  ENMT: negative unless stated in HPI  Respiratory: negative unless stated in HPI  Cardiovascular: negative unless stated in HPI  Gastrointestinal: negative unless stated in HPI  Genitourinary: negative unless stated in HPI  Hema/Lymph: negative  unless stated in HPI  Endocrine: negative unless stated in HPI  Immunologic: negative unless stated in HPI  Musculoskeletal: ambulates well without assistance  Integumentary: negative unless stated in HPI  Neurologic: negative unless stated in HPI  All Other ROS: ?AAOX3, no distress?  Physical Exam  Vitals & Measurements  T:?36.5? ?C (Oral)? HR:?49(Peripheral)? BP:?114/71? SpO2:?99%?  HT:?180?cm? HT:?180?cm? WT:?66.2?kg? WT:?66.2?kg? BMI:?20.43?  Eye: PERRL, no icterus, normal conjunctivae  HENT: bilateral TM normal, normal pharynx pink, no tonsillar edema, no thrush, no sinus tenderness palpated, multiple decayed teeth  Neck: no LAD  Respiratory: CTA, BBS, no SOB, brisk capp refill  Cardiovascular: bradycardia noted, s1 s2 noted,?no chest pain, no edema,  Gastrointestinal: BS + 4 quads, soft NT, ND, neg CVAT bilateral, no organmegaly  Genitourinary: neg  Rectal: anal sphincter intact, no masses or ulcerations noted, prostate smooth and normal  Lymphatics: no ?LAD  Musculoskeletal: ALVAREZ well, + 2 radial and pedal pulses, normal DTRs, no deformity  Integumentary: skin intact  Neurologic: CN II-IX intact?  ?  Assessment/Plan  1.?AIDS  Continue combivir, kaletra, and isentress. Reviewed HIV genotype.? Pt has some MDR with a K65R, K103N, V106M, and M184V mutations. He is resistant to all NRTIs except Zidovudine which is a twice a day regimen.?Pt would like to continue current regimen.? Medication adherence , resistance, and sexual protection with condoms discussed in depth  RTC?4 months with Angelique, labs today  Referred to eye clinic  Anal pap smear and STD screening done 11/01/2018  Ordered:  lamivudine-zidovudine, 1 tab(s), Oral, BID, # 60 tab(s), 4 Refill(s), Pharmacy: [facility]  lopinavir-ritonavir, 2 tab(s), Oral, BID, # 120 tab(s), 4 Refill(s), Pharmacy: [facility]  raltegravir, 400 mg = 1 tab(s), Oral, BID, # 60 tab(s), 4 Refill(s), Pharmacy: [facility]  CBC w/ Auto Diff, Routine collect,  11/01/18 15:04:00 CDT, Blood, Order for future visit, Stop date 11/01/18 15:04:00 CDT, Lab Collect, AIDS, 11/01/18 15:04:00 CDT  Clinic Follow up, *Est. 03/01/19 3:00:00 CST, Order for future visit, AIDS  Vitamin D deficiency  Tobacco user(  Probable Diagnosis  )  HLD (hyperlipidemia), [facility]  Comprehensive Metabolic Panel, Routine collect, 11/01/18 15:04:00 CDT, Blood, Order for future visit, Stop date 11/01/18 15:04:00 CDT, Lab Collect, AIDS, 11/01/18 15:04:00 CDT  Creatine Kinase, Routine collect, 11/01/18 15:04:00 CDT, Blood, Order for future visit, Stop date 11/01/18 15:04:00 CDT, Lab Collect, AIDS, 11/01/18 15:04:00 CDT  Creatine Kinase MB, Routine collect, 11/01/18 15:04:00 CDT, Blood, Order for future visit, Stop date 11/01/18 15:04:00 CDT, Lab Collect, AIDS, 11/01/18 15:04:00 CDT  Drug Screen Urine [facility], Routine collect, Urine, Order for future visit, 11/01/18 15:04:00 CDT, Stop date 11/01/18 15:04:00 CDT, Nurse collect, AIDS, 11/01/18 15:04:00 CDT  Internal Referral to Internal Medicine, Establish care with a PCP, *Est. 12/01/18 3:00:00 CST, Future Visit?, AIDS  Vitamin D deficiency  Tobacco user(  Probable Diagnosis  )  HLD (hyperlipidemia)  Internal Referral to Ophthalmology, HIV annual eye exam, *Est. 12/01/18 3:00:00 CST, Future Visit?, AIDS  Office/Outpatient Visit Level 4 Established [id number] PC, AIDS  Vitamin D deficiency  Tobacco user(  Probable Diagnosis  )  HLD (hyperlipidemia)  Cancer screening  Routine screening for STI (sexually transmitted infection)  Immunization due, [facility], 11/01/18 15:00:00 CDT  Troponin-I, Routine collect, 11/01/18 15:04:00 CDT, Blood, Order for future visit, Stop date 11/01/18 15:04:00 CDT, Lab Collect, AIDS, 11/01/18 15:04:00 CDT  ?  2.?Vitamin D deficiency  repeat Vit D level today  Ordered:  Clinic Follow up, *Est. 03/01/19 3:00:00 CST, Order for future visit, AIDS  Vitamin D deficiency  Tobacco user(  Probable Diagnosis  )  HLD  (hyperlipidemia), [facility]  Internal Referral to Internal Medicine, Establish care with a PCP, *Est. 12/01/18 3:00:00 CST, Future Visit?, AIDS  Vitamin D deficiency  Tobacco user(  Probable Diagnosis  )  HLD (hyperlipidemia)  Office/Outpatient Visit Level 4 Established [id number] PC, AIDS  Vitamin D deficiency  Tobacco user(  Probable Diagnosis  )  HLD (hyperlipidemia)  Cancer screening  Routine screening for STI (sexually transmitted infection)  Immunization due, [facility], 11/01/18 15:00:00 CDT  Vitamin D, 25-Hydroxy Level, Routine collect, *Est. 11/01/18 3:00:00 CDT, Blood, Order for future visit, *Est. Stop date 11/01/18 3:00:00 CDT, Lab Collect, Vitamin D deficiency, 11/01/18 15:00:00 CDT  ?  3.?Tobacco user(  Probable Diagnosis  )  Stop smoking !!!  Ordered:  Clinic Follow up, *Est. 03/01/19 3:00:00 CST, Order for future visit, AIDS  Vitamin D deficiency  Tobacco user(  Probable Diagnosis  )  HLD (hyperlipidemia), [facility]  Internal Referral to Internal Medicine, Establish care with a PCP, *Est. 12/01/18 3:00:00 CST, Future Visit?, AIDS  Vitamin D deficiency  Tobacco user(  Probable Diagnosis  )  HLD (hyperlipidemia)  Internal Referral to Smoking Cessation, ASKED and ADVISED to quit. Not Ready., 11/01/18 15:00:00 CDT, Tobacco user(  Probable Diagnosis  )  Office/Outpatient Visit Level 4 Established [id number] , AIDS  Vitamin D deficiency  Tobacco user(  Probable Diagnosis  )  HLD (hyperlipidemia)  Cancer screening  Routine screening for STI (sexually transmitted infection)  Immunization due, [facility], 11/01/18 15:00:00 CDT  ?  4.?HLD (hyperlipidemia)  Continue meds.? Patient educated on the importance of?following ?a low cholesterol diet.  Ordered:  pravastatin, 80 mg = 1 tab(s), Oral, Daily, # 30 tab(s), 4 Refill(s), Pharmacy: [facility]  Clinic Follow up, *Est. 03/01/19 3:00:00 CST, Order for future visit, AIDS  Vitamin D deficiency  Tobacco user(   Probable Diagnosis  )  HLD (hyperlipidemia), [facility]  Internal Referral to Internal Medicine, Establish care with a PCP, *Est. 12/01/18 3:00:00 CST, Future Visit?, AIDS  Vitamin D deficiency  Tobacco user(  Probable Diagnosis  )  HLD (hyperlipidemia)  Office/Outpatient Visit Level 4 Established 22923 PC, AIDS  Vitamin D deficiency  Tobacco user(  Probable Diagnosis  )  HLD (hyperlipidemia)  Cancer screening  Routine screening for STI (sexually transmitted infection)  Immunization due, [facility], 11/01/18 15:00:00 CDT  ?  5.?Cancer screening  anal pap smear collected  Ordered:  Office/Outpatient Visit Level 4 Established [id number] PC, AIDS  Vitamin D deficiency  Tobacco user(  Probable Diagnosis  )  HLD (hyperlipidemia)  Cancer screening  Routine screening for STI (sexually transmitted infection)  Immunization due, [facility], 11/01/18 15:00:00 CDT  Pathology Non-Gyn Request [facility], *Est. 11/01/18 3:00:00 CDT, Routine, Order for future visit, AP Specimen, ANAL PAP SMEAR, ANAL CANCER SCREEN, Nurse Collect, Print Label, RT - Routine, [email or web address], Hold Until Collected, Cancer screening, *Est. 11/01/18 3:00:00 CDT  ?  6.?Routine screening for STI (sexually transmitted infection)  ?oral and CT/GC collected  Ordered:  Chlamydia trach & N.gonorr Rectal AMD Lab [facility], Routine collect, Rectal Swab, Order for future visit, *Est. 11/01/18 3:00:00 CDT, *Est. Stop date 11/01/18 3:00:00 CDT, Nurse collect, Routine screening for STI (sexually transmitted infection), 11/01/18 15:00:00 CDT  Chlamydia trach & N.gonorrhoeae Pharyn AMD-, Routine collect, Throat, Order for future visit, *Est. 11/01/18 3:00:00 CDT, *Est. Stop date 11/01/18 3:00:00 CDT, Nurse collect, Routine screening for STI (sexually transmitted infection), 11/01/18 15:00:00 CDT  Office/Outpatient Visit Level 4 Established [id number] PC, AIDS  Vitamin D deficiency  Tobacco user(  Probable Diagnosis  )  HLD (hyperlipidemia)  Cancer screening   Routine screening for STI (sexually transmitted infection)  Immunization due, [facility], 11/01/18 15:00:00 CDT  ?  7.?Immunization due  Ordered:  Influenza Virus Vaccine, Inactivated, 0.5 mL, form: Susp, IM, Once-Unscheduled, first dose 11/01/18 15:00:00 CDT  meningococcal conjugate vaccine, 0.5 mL, form: Powder-Inj, IM, Once-Unscheduled, first dose 11/01/18 15:00:00 CDT  Office/Outpatient Visit Level 4 Established 68028 PC, AIDS  Vitamin D deficiency  Tobacco user(  Probable Diagnosis  )  HLD (hyperlipidemia)  Cancer screening  Routine screening for STI (sexually transmitted infection)  Immunization due, [facility], 11/01/18 15:00:00 CDT  ?  8.?Bradycardia  EKG today along with cardiac enzymes. Referred to cardiology for evaluation Encouraged continued abstinence from alcohol and illicit drugs. No Tylenol at doses greater than 2g daily. No sharing needles, razors, nail clippers. Counseled regarding safe sex at length, condoms offered.  1626 Cardiac enzymes WNL, EKG NSR.?  Ordered:  Electrocardiogram Adult 12 Lead, 11/01/18 15:04:00 CDT, Routine, 11/01/18 15:04:00 CDT, Ambulatory, Orders for Future Visit, -1, -1, 11/01/18 15:04:00 CDT, Bradycardia  ?  9.?Neck pain  Refer to [facility] Neurosurgery for evaluation of chronic neck/shoulder pain.  ?  Orders:  loratadine, 10 mg = 1 cap(s), Oral, Daily, PRN PRN allergy symptoms, # 30 cap(s), 4 Refill(s), Pharmacy: [facility]  XR Chest 2 Views, Routine, 11/01/18 15:05:00 CDT, Other (please specify), None, Ambulatory, Rad Type, Order for future visit, Left shoulder pain, Not Scheduled, 11/01/18 15:05:00 CDT  XR Spine Cervical 2 or 3 Views, Routine, 11/01/18 15:05:00 CDT, Other (please specify), None, Ambulatory, Rad Type, Order for future visit, Left shoulder pain, Not Scheduled, 11/01/18 15:05:00 CDT   Problem List/Past Medical History  Ongoing  HIV  HIV  Tobacco user(  Probable Diagnosis  )  Historical  Shoulder pain  Procedure/Surgical  History  Appendectomy;  left eye surgery   Medications  influenza virus vaccine, inactivated preserve-free pediatric quadrivalent intramuscular suspension, 0.5 mL, IM, Once-Unscheduled  Isentress 400 mg oral tablet, 400 mg= 1 tab(s), Oral, BID, 4 refills  Kaletra 200 mg-50 mg oral tablet, 2 tab(s), Oral, BID, 4 refills  lamivudine-zidovudine 150 mg-300 mg oral tablet, 1 tab(s), Oral, BID, 4 refills  loratadine 10 mg oral capsule, 10 mg= 1 cap(s), Oral, Daily, PRN, 4 refills  meningococcal conjugate vaccine intramuscular injection, 0.5 mL, IM, Once-Unscheduled  Pravastatin 80 mg Oral Tab, 80 mg= 1 tab(s), Oral, Daily, 4 refills  Allergies  Seafood?(stomach cramps)  Tylenol?(vomiting)  iodine?(stomach cramps)  Social History  Alcohol - Denies Alcohol Use, 06/08/2013  Past, 03/01/2015  Employment/School  Unemployed, 03/01/2015  Exercise  Exercise type: Aerobics., 10/09/2017  Home/Environment  Lives with Spouse., 10/09/2017  Nutrition/Health  Regular, 10/09/2017  Sexual  Sexually active: Yes., 10/09/2017  Substance Abuse - Denies Substance Abuse, 06/08/2013  Current, Marijuana, 11/01/2018  Current, Marijuana, crack, 1-2 times per week, Previous treatment: Outpatient., 04/19/2018  Tobacco - High Risk, 09/14/2014  Current every day smoker, Cigarettes, N/A, 10 per day., 11/01/2018  Current every day smoker, Cigarettes, 20 per day. 11 year(s). Started age 15.0 Years. Ready to change: Yes., 04/19/2018  Family History  Alcohol use: Sister.  Alzheimers disease: Mother.  DM (diabetes mellitus): Mother.  GLAUCOMA: Sister.  Primary malignant neoplasm of colon: Brother.  Seizure: Father.  Substance abuse: Sister.  Tobacco use: Brother.  Tobacco user: Father and Sister.  Immunizations  Vaccine Date Status Comments   influenza virus vaccine, inactivated 10/09/2017 Given    pneumococcal 23-polyvalent vaccine 03/29/2016 Given    influenza virus vaccine, inactivated 03/29/2016 Given    influenza virus vaccine, inactivated 10/31/2014  Recorded    tetanus/diphth/pertuss (Tdap) adult/adol 08/20/2014 Recorded    pneumococcal 13-valent conjugate vaccine 08/20/2014 Recorded    influenza virus vaccine, inactivated 11/02/2012 Recorded    hepatitis B adult vaccine 12/13/2010 Recorded [3/17/2015] immune   hepatitis A adult vaccine 12/13/2010 Recorded [3/17/2015] immune   pneumococcal 23-polyvalent vaccine 12/01/2010 Recorded    Health Maintenance  Health Maintenance  ???Pending?(in the next year)  ??? ??Due?  ??? ? ? ?ADL Screening due??11/01/18??and every 1??year(s)  ??? ? ? ?Alcohol Misuse Screening due??11/01/18??and every 1??year(s)  ??? ? ? ?Aspirin Therapy for CVD Prevention due??11/01/18??and every 1??year(s)  ??? ? ? ?Colorectal Screening due??11/01/18??and every?  ??? ? ? ?Diabetes Screening due??11/01/18??and every?  ??? ? ? ?Influenza Vaccine due??11/01/18??and every?  ??? ??Due In Future?  ??? ? ? ?Depression Screening not due until??03/01/19??and every 1??year(s)  ??? ? ? ?Smoking Cessation not due until??04/19/19??and every 1??year(s)  ???Satisfied?(in the past 1 year)  ??? ??Satisfied?  ??? ? ? ?Blood Pressure Screening on??11/01/18.??Satisfied by Kike Alfaro LPN  ??? ? ? ?Body Mass Index Check on??11/01/18.??Satisfied by Kike Alfaro LPN  ??? ? ? ?Depression Screening on??03/01/18.??Satisfied by Selma Alfaro RN  ??? ? ? ?Diabetes Screening on??10/03/18.??Satisfied by Yael Rodriguez  ??? ? ? ?Influenza Vaccine on??11/01/18.??Satisfied by Angelique Kay NP  ??? ? ? ?Lipid Screening on??03/01/18.??Satisfied by Kemal Mratinez, Mansoor Morrison  ??? ? ? ?Obesity Screening on??11/01/18.??Satisfied by Kike Alfaro LPN  ??? ? ? ?Smoking Cessation on??04/19/18.??Satisfied by Angelina CABELLO, Madina SULTANA  ?  ?  Lab Results  Test Name Test Result Date/Time Comments   BUN 13 mg/dL 10/03/2018 11:05 CDT    Creatinine 1.30 mg/dL 10/03/2018 11:05 CDT    AST 17 unit/L 10/03/2018 11:05 CDT    ALT 15 unit/L 10/03/2018 11:05 CDT    Alk Phos 63 unit/L  10/03/2018 11:05 CDT    WBC 5.0 x10(3)/mcL 10/03/2018 11:05 CDT    Hgb 13.3 gm/dL (Low) 10/03/2018 11:05 CDT    Hct 35.6 % (Low) 10/03/2018 11:05 CDT    Platelet 205 x10(3)/mcL 10/03/2018 11:05 CDT    CD4 Absolute 559 unit/L (Low) 10/03/2018 11:05 CDT    HIV1 RNA PCR-LC <20 cpy/mL 10/03/2018 11:05 CDT HIV-1 RNA detected<br/> <br/>The reportable range for this assay is 20 to 10,000,000<br/>copies HIV-1 RNA/mL.   Diagnostic Results  (05/16/2017 13:52 CDT MRI Cervical Spine W/O Contrast)  ?  * Final Report *  ?  Reason For Exam  Radiculopathy, cervical region;Radiculopathy cervical  ?  Radiology Report  Indication: Cervical radiculopathy, history of HIV  ?  FINDINGS: Standard axial and sagittal sequences were performed through  the cervical spine. This study is compared to a prior MRI dated  2/19/2015 which identified posterior disc bulges at each of the levels  of C2-3 through C5-6 producing foraminal stenosis on the left at C3-4,  bilaterally at C4-5 and on the right at C5-6.  ?  Posterior disc bulge and spondylosis is again identified from C2-3  through C5-6. Additional stable posterior disc bulges are also  identified in the upper thoracic spine at T1-T2 and T2-3.  ?  Axial images demonstrate no significant abnormality at C2-3. At C3-4,  broad posterior disc bulge and spondylosis is identified producing  bilateral foraminal stenosis, greater on the left. No focal disc  herniations are identified in spinal canal is patent centrally.  ?  At C4-5, there is stable bilateral foraminal stenosis, greater on the  left. No focal disc herniations are identified in the spinal canal is  adequate centrally.  ?  At C5-6, stable bilateral foraminal stenosis is noted. No focal disc  herniations are identified in the spinal canal is patent. No  abnormalities are appreciated at C6-7 or C7-T1.  ?  No deformity or intrinsic signal abnormality is identified in the  spinal cord. The tips of the cerebellar tonsils are normal  position.  ?  There is relatively increased signal abnormality consistent with  discogenic changes in the C4 vertebral body and superior endplate of  C5 in comparison to February, 2015.  ?  IMPRESSION:  1. Broad posterior disc bulge is identified producing foraminal  stenoses at each of the levels from C3-4 through C5-6. There  appearance is grossly unchanged since February, 2015.  2. Increasing discogenic bone marrow signal changes in the C4 and C5  vertebral bodies compared to 2015.  3. Additional posterior disc bulge is identified in the upper thoracic  spine at T1-2 and T2-3 without evidence of spinal cord contact or  spinal canal compromise. Further, more sensitive imaging evaluation by  MRI of the thoracic spine may be helpful for further evaluation as  desired.  ?  ?  Signature Line  Electronically Signed By: Luis F Alba MD  Date/Time Signed: 05/16/2017 14:54  ?  ?  This document has an image  ?  Result type:???????  MRI Cervical Spine W/O Contrast  Result date:???????  May 16, 2017 13:52 CDT  Result status:???????  Auth (Verified)  Result title:???????  MRI Cervical Spine W/O Contrast  Performed by:???????  Luis F Alba MD on May 16, 2017 14:47 CDT  Verified by:???????  Luis F Alba MD on May 16, 2017 14:54 CDT  Encounter info:???????  [id number], [facility], Outpatient, 5/16/2017 - 5/16/2017  ?  ?  ? [1]     [1]?MRI Cervical Spine W/O Contrast; Luis F Alba MD 05/16/2017 13:52 CDT

## 2022-05-03 NOTE — HISTORICAL [facility]
This is a historical note converted from [facility]. Formatting and pictures may have been removed.  Please reference Shea for original formatting and attached multimedia. Chief Complaint  f/u HIV  History of Present Illness  48 y/o BM here for HIV f/u.??Dx in 1987. Pt reports 100% adherence to Combivir, Kaletra, and Isentress.??He has been on this particular regimen upwards of 5 years. Reviewed HIV resistance testing?with patient.? Pt would still need to remain on Combivir?1 po? BID so he is opting?to continuing current regimen as both he and his partner?Nakita?Siri are on similar regimens.? Last HIV?VL?<20 and Cd4 683 on 9/15/17.??Pt denies fever, headaches, visual problems, N/V/D, or abdominal pain. He reports adherence to Vitamin D which?I plan to repeat today and adherence to?Pravastatin.? I?also?plan to repeat?lipid panel today.??Pt is smoker. He smokes approx less than 1 ppd x >30 years and is not interested in quitting at present.? Pt states he never recd an appt for eye clinic. I will send another referral.?  Review of Systems  Constitutional:negative unless stated in HPI  Eye: negative unless stated in HPI  ENMT: negative unless stated in HPI  Respiratory: negative unless stated in HPI  Cardiovascular: negative unless stated in HPI  Gastrointestinal: negative unless stated in HPI  Genitourinary: negative unless stated in HPI  Hema/Lymph: negative unless stated in HPI  Endocrine: negative unless stated in HPI  Immunologic: negative unless stated in HPI  Musculoskeletal: ambulates without assistance  Integumentary: negative unless stated in HPI  Neurologic: negative unless stated in HPI  All Other ROS: ?AAOX3, no distress?  Physical Exam  Vitals & Measurements  T:?36.6? ?C (Oral)? HR:?53(Peripheral)? RR:?19? BP:?117/71?  HT:?180?cm? HT:?180?cm? WT:?65?kg? WT:?65?kg? BMI:?20.06?  Eye: PERRL, no icterus, normal conjunctivae  HENT: bilateral TM normal, normal pharynx pink, no tonsillar edema, no thrush, no  sinus tenderness palpated  Neck: no LAD  Respiratory: CTA, BBS, no SOB, brisk capp refill  Cardiovascular: RRR, s1 s2 noted,?no chest pain, no edema,  Gastrointestinal: BS + 4 quads, soft NT, ND, neg CVAT bilateral, no organmegaly  Genitourinary: neg  Lymphatics: no ?LAD  Musculoskeletal: ALVAREZ well, no deformity  Integumentary: skin intact  Neurologic: CN II-IX intact???  Assessment/Plan  1.?AIDS  Continue combivir, kaletra, and isentress.  Medication adherence , resistance, and sexual protection with condoms discussed in depth  RTC?4 months with Angelique, labs today  Referred to eye clinic  Anal pap smear and STD screening done 3/31/17 . Plan to repeat at next visit.?  Ordered:  lamivudine-zidovudine, 1 tab(s), Oral, BID, # 60 tab(s), 4 Refill(s), Pharmacy: [facility]  lopinavir-ritonavir, 2 tab(s), Oral, BID, # 120 tab(s), 4 Refill(s), Pharmacy: [facility]  raltegravir, 400 mg = 1 tab(s), Oral, BID, # 60 tab(s), 4 Refill(s), Pharmacy: [facility]  CBC w/ Auto Diff, Routine collect, *Est. 03/01/18 3:00:00 CST, Blood, Order for future visit, *Est. Stop date 03/01/18 3:00:00 CST, Lab Collect, AIDS, 03/01/18 3:00:00 CST  CD4 Lymphoctye Count, Routine collect, *Est. 03/01/18 3:00:00 CST, Blood, Order for future visit, *Est. Stop date 03/01/18 3:00:00 CST, Lab Collect, AIDS, 03/01/18 3:00:00 CST  Chlamydia trach and N. gonorrhea PCR, Routine collect, Urine, Order for future visit, *Est. 03/01/18 3:00:00 CST, *Est. Stop date 03/01/18 3:00:00 CST, Nurse collect, AIDS  Clinic Follow up, *Est. 07/01/18 3:00:00 CDT, Order for future visit, AIDS  Vitamin D deficiency  Tobacco user(  Probable Diagnosis  )  HLD (hyperlipidemia), [facility]  Comprehensive Metabolic Panel, Routine collect, *Est. 03/01/18 3:00:00 CST, Blood, Order for future visit, *Est. Stop date 03/01/18 3:00:00 CST, Lab Collect, AIDS, 03/01/18 3:00:00 CST  Internal Referral to Internal Medicine, Needs to establish care with a PCP,  *Est. 03/31/18 3:00:00 CDT, Future Visit?, AIDS  Vitamin D deficiency  Tobacco user(  Probable Diagnosis  )  HLD (hyperlipidemia)  Lipid Panel, Routine collect, *Est. 03/01/18 3:00:00 CST, Blood, Order for future visit, *Est. Stop date 03/01/18 3:00:00 CST, Lab Collect, AIDS, 03/01/18 3:00:00 CST  Office/Outpatient Visit Level 4 Established [id number] PC, AIDS  Vitamin D deficiency  Tobacco user(  Probable Diagnosis  )  HLD (hyperlipidemia), [facility], 03/01/18 10:39:00 CST  RNA, PCR(NonGraph)rfx/GenoPRIme(R)-[facility] [id number], Routine collect, *Est. 03/01/18 3:00:00 CST, Blood, Order for future visit, *Est. Stop date 03/01/18 3:00:00 CST, Lab Collect, AIDS, 03/01/18 3:00:00 CST  Syphilis Antibody (with Reflex RPR), Routine collect, *Est. 03/01/18 3:00:00 CST, Blood, Order for future visit, *Est. Stop date 03/01/18 3:00:00 CST, Lab Collect, AIDS, 03/01/18 3:00:00 CST  T Spot Test for M. tuberculosis, Routine collect, *Est. 03/01/18 3:00:00 CST, Blood, Order for future visit, *Est. Stop date 03/01/18 3:00:00 CST, Lab Collect, AIDS, 03/01/18 3:00:00 CST  Thyroid Stimulating Hormone, Routine collect, *Est. 03/01/18 3:00:00 CST, Blood, Order for future visit, *Est. Stop date 03/01/18 3:00:00 CST, Lab Collect, AIDS, 03/01/18 3:00:00 CST  Urinalysis with Microscopic if Indicated, Routine collect, Urine, Order for future visit, *Est. 03/01/18 3:00:00 CST, *Est. Stop date 03/01/18 3:00:00 CST, Nurse collect, AIDS, 03/01/18 3:00:00 CST  Vitamin D, 25-Hydroxy Level, Routine collect, *Est. 03/01/18 3:00:00 CST, Blood, Order for future visit, *Est. Stop date 03/01/18 3:00:00 CST, Lab Collect, AIDS, 03/01/18 3:00:00 CST  ?  2.?Vitamin D deficiency  Continue Vit D.? Pt counseled on importance of Vit D to bone and organ health.  Ordered:  ergocalciferol, 50,000 IntUnit = 1 cap(s), Oral, QOWK, # 2 cap(s), 4 Refill(s), Pharmacy: [facility]  Clinic Follow up, *Est. 07/01/18 3:00:00 CDT, Order for future visit, AIDS   Vitamin D deficiency  Tobacco user(  Probable Diagnosis  )  HLD (hyperlipidemia), [facility]  Internal Referral to Internal Medicine, Needs to establish care with a PCP, *Est. 03/31/18 3:00:00 CDT, Future Visit?, AIDS  Vitamin D deficiency  Tobacco user(  Probable Diagnosis  )  HLD (hyperlipidemia)  Office/Outpatient Visit Level 4 Established [id number] PC, AIDS  Vitamin D deficiency  Tobacco user(  Probable Diagnosis  )  HLD (hyperlipidemia), [facility], 03/01/18 10:39:00 CST  ?  3.?Tobacco user(  Probable Diagnosis  )  Stop smoking !!! Referred to smoking cessation.  Ordered:  Clinic Follow up, *Est. 07/01/18 3:00:00 CDT, Order for future visit, AIDS  Vitamin D deficiency  Tobacco user(  Probable Diagnosis  )  HLD (hyperlipidemia), [facility]  Internal Referral to Internal Medicine, Needs to establish care with a PCP, *Est. 03/31/18 3:00:00 CDT, Future Visit?, AIDS  Vitamin D deficiency  Tobacco user(  Probable Diagnosis  )  HLD (hyperlipidemia)  Internal Referral to Smoking Cessation, ASKED and ADVISED to quit. Not Ready., 03/01/18 10:40:00 CST, Tobacco user(  Probable Diagnosis  )  Office/Outpatient Visit Level 4 Established [id number] PC, AIDS  Vitamin D deficiency  Tobacco user(  Probable Diagnosis  )  HLD (hyperlipidemia), [facility], 03/01/18 10:39:00 CST  ?  4.?HLD (hyperlipidemia)  Continue meds.? Patient educated on the importance of?following ?a low cholesterol diet. Repeat lipid today.  Ordered:  pravastatin, 80 mg = 1 tab(s), Oral, Daily, # 30 tab(s), 4 Refill(s), Pharmacy: [facility]  Clinic Follow up, *Est. 07/01/18 3:00:00 CDT, Order for future visit, AIDS  Vitamin D deficiency  Tobacco user(  Probable Diagnosis  )  HLD (hyperlipidemia), [facility]  Internal Referral to Internal Medicine, Needs to establish care with a PCP, *Est. 03/31/18 3:00:00 CDT, Future Visit?, AIDS  Vitamin D deficiency  Tobacco user(  Probable Diagnosis  )  HLD  (hyperlipidemia)  Office/Outpatient Visit Level 4 Established [id number] PC, AIDS  Vitamin D deficiency  Tobacco user(  Probable Diagnosis  )  HLD (hyperlipidemia), [facility], 03/01/18 10:39:00 CST  ?   Problem List/Past Medical History  Ongoing  Tobacco user(  Probable Diagnosis  )  Historical  Shoulder pain  Procedure/Surgical History  Incision and drainage of abscess (eg, carbuncle, suppurative hidradenitis, cutaneous or subcutaneous abscess, cyst, furuncle, or paronychia); complicated or multiple. (09/14/2014), Other incision with drainage of skin and subcutaneous tissue (09/14/2014), Appendectomy;, left eye surgery.  Medications  ergocalciferol 50,000 intUnit oral capsule, 09684 IntUnit= 1 cap(s), Oral, QOWK, 4 refills  Isentress 400 mg oral tablet, 400 mg= 1 tab(s), Oral, BID, 4 refills  Kaletra 200 mg-50 mg oral tablet, 2 tab(s), Oral, BID, 4 refills  lamivudine-zidovudine 150 mg-300 mg oral tablet, 1 tab(s), Oral, BID, 4 refills  Pravastatin 80 mg Oral Tab, 80 mg= 1 tab(s), Oral, Daily, 4 refills  Allergies  Seafood?(stomach cramps)  Tylenol?(vomiting)  iodine?(stomach cramps)  Social History  Alcohol - Denies Alcohol Use, 06/08/2013  Past, 03/01/2015  Employment/School  Unemployed, 03/01/2015  Exercise  Exercise type: Aerobics., 10/09/2017  Home/Environment  Lives with Spouse., 10/09/2017  Nutrition/Health  Regular, 10/09/2017  Sexual  Sexually active: Yes., 10/09/2017  Substance Abuse - Denies Substance Abuse, 06/08/2013  Past, crack, 1-2 times per week, Previous treatment: Outpatient., 03/29/2016  Tobacco - High Risk, 09/14/2014  Current every day smoker, Cigarettes, 10 per day. 11 year(s). Started age 15.0 Years. Ready to change: Yes., 03/29/2016  Family History  Alcohol use: Sister.  Alzheimers disease: Mother.  DM (diabetes mellitus): Mother.  GLAUCOMA: Sister.  Primary malignant neoplasm of colon: Brother.  Seizure: Father.  Substance abuse: Sister.  Tobacco use: Brother.  Tobacco user: Father and  Sister.  Immunizations  Vaccine Date Status Comments   influenza virus vaccine, inactivated 10/09/2017 Given    pneumococcal 23-polyvalent vaccine 03/29/2016 Given    influenza virus vaccine, inactivated 03/29/2016 Given    influenza virus vaccine, inactivated 10/31/2014 Recorded    tetanus/diphth/pertuss (Tdap) adult/adol 08/20/2014 Recorded    pneumococcal 13-valent conjugate vaccine 08/20/2014 Recorded    influenza virus vaccine, inactivated 11/02/2012 Recorded    hepatitis B adult vaccine 12/13/2010 Recorded [3/17/2015] immune   hepatitis A adult vaccine 12/13/2010 Recorded [3/17/2015] immune   pneumococcal 23-polyvalent vaccine 12/01/2010 Recorded       [1]?Office Visit Note; Eliza CERON, Angelique HANNON 10/09/2017 10:13 CDT

## 2022-05-06 ENCOUNTER — TELEPHONE (OUTPATIENT)
Dept: INFECTIOUS DISEASES | Facility: CLINIC | Age: 52
End: 2022-05-06
Payer: MEDICAID

## 2022-05-06 DIAGNOSIS — B20 HIV DISEASE: Primary | ICD-10-CM

## 2022-05-06 NOTE — TELEPHONE ENCOUNTER
----- Message from Massiel Weekly sent at 5/6/2022 11:04 AM CDT -----  Regarding: [facility] ID:  Med Refill Request  Pt called for New RX on [facility].  Pharmacy:  [facility]

## 2022-12-12 ENCOUNTER — TELEPHONE (OUTPATIENT)
Dept: INFECTIOUS DISEASES | Facility: CLINIC | Age: 52
End: 2022-12-12
Payer: MEDICAID

## 2022-12-12 NOTE — TELEPHONE ENCOUNTER
Tried calling pt to reschdule missed appt, no answer, no option to leave voice mail. Mailed letter for pt to return call

## 2023-01-19 ENCOUNTER — TELEPHONE (OUTPATIENT)
Dept: INFECTIOUS DISEASES | Facility: CLINIC | Age: 53
End: 2023-01-19
Payer: MEDICAID

## 2023-01-19 DIAGNOSIS — B20 HIV DISEASE: Primary | ICD-10-CM

## 2023-01-19 NOTE — TELEPHONE ENCOUNTER
Pt came in today for labs, informed pt he needs to schedule appt for intake and labs can be done at that time. Spoke to Mary Jane and scheduled pt for 1/24/23 at 1pm for intake.

## 2023-02-26 ENCOUNTER — HOSPITAL ENCOUNTER (EMERGENCY)
Facility: HOSPITAL | Age: 53
Discharge: HOME | End: 2023-02-26
Attending: FAMILY MEDICINE
Payer: MEDICAID

## 2023-02-26 VITALS
DIASTOLIC BLOOD PRESSURE: 78 MMHG | BODY MASS INDEX: 30.86 KG/M2 | RESPIRATION RATE: 15 BRPM | SYSTOLIC BLOOD PRESSURE: 126 MMHG | TEMPERATURE: 98 F | WEIGHT: 220.44 LBS | HEART RATE: 89 BPM | OXYGEN SATURATION: 100 % | HEIGHT: 71 IN

## 2023-02-26 DIAGNOSIS — J06.9 UPPER RESPIRATORY TRACT INFECTION, UNSPECIFIED TYPE: Primary | ICD-10-CM

## 2023-02-26 DIAGNOSIS — R06.02 SHORTNESS OF BREATH: ICD-10-CM

## 2023-02-26 DIAGNOSIS — R91.1 LUNG NODULE: ICD-10-CM

## 2023-02-26 LAB
ALBUMIN SERPL-MCNC: 3.3 G/DL (ref 3.5–5)
ALBUMIN/GLOB SERPL: 0.7 RATIO (ref 1.1–2)
ALP SERPL-CCNC: 68 UNIT/L (ref 40–150)
ALT SERPL-CCNC: 22 UNIT/L (ref 0–55)
AST SERPL-CCNC: 29 UNIT/L (ref 5–34)
BASOPHILS # BLD AUTO: 0.02 X10(3)/MCL (ref 0–0.2)
BASOPHILS NFR BLD AUTO: 0.3 %
BILIRUBIN DIRECT+TOT PNL SERPL-MCNC: 0.6 MG/DL
BUN SERPL-MCNC: 16 MG/DL (ref 8.4–25.7)
CALCIUM SERPL-MCNC: 9.1 MG/DL (ref 8.4–10.2)
CHLORIDE SERPL-SCNC: 107 MMOL/L (ref 98–107)
CK MB SERPL-MCNC: 6.3 NG/ML
CK SERPL-CCNC: 411 U/L (ref 30–200)
CO2 SERPL-SCNC: 24 MMOL/L (ref 22–29)
CREAT SERPL-MCNC: 0.92 MG/DL (ref 0.73–1.18)
EOSINOPHIL # BLD AUTO: 0.3 X10(3)/MCL (ref 0–0.9)
EOSINOPHIL NFR BLD AUTO: 4.2 %
ERYTHROCYTE [DISTWIDTH] IN BLOOD BY AUTOMATED COUNT: 13.2 % (ref 11.5–17)
FLUAV AG UPPER RESP QL IA.RAPID: NOT DETECTED
FLUBV AG UPPER RESP QL IA.RAPID: NOT DETECTED
GFR SERPLBLD CREATININE-BSD FMLA CKD-EPI: >60 MLS/MIN/1.73/M2
GLOBULIN SER-MCNC: 4.8 GM/DL (ref 2.4–3.5)
GLUCOSE SERPL-MCNC: 93 MG/DL (ref 74–100)
HCT VFR BLD AUTO: 36.9 % (ref 42–52)
HGB BLD-MCNC: 12.4 G/DL (ref 14–18)
IMM GRANULOCYTES # BLD AUTO: 0.01 X10(3)/MCL (ref 0–0.04)
IMM GRANULOCYTES NFR BLD AUTO: 0.1 %
LYMPHOCYTES # BLD AUTO: 1.21 X10(3)/MCL (ref 0.6–4.6)
LYMPHOCYTES NFR BLD AUTO: 17.1 %
MCH RBC QN AUTO: 32.8 PG
MCHC RBC AUTO-ENTMCNC: 33.6 G/DL (ref 33–36)
MCV RBC AUTO: 97.6 FL (ref 80–94)
MONOCYTES # BLD AUTO: 0.61 X10(3)/MCL (ref 0.1–1.3)
MONOCYTES NFR BLD AUTO: 8.6 %
NEUTROPHILS # BLD AUTO: 4.94 X10(3)/MCL (ref 2.1–9.2)
NEUTROPHILS NFR BLD AUTO: 69.7 %
NRBC BLD AUTO-RTO: 0 %
PLATELET # BLD AUTO: 190 X10(3)/MCL (ref 130–400)
PMV BLD AUTO: 10 FL (ref 7.4–10.4)
POTASSIUM SERPL-SCNC: 4.1 MMOL/L (ref 3.5–5.1)
PROT SERPL-MCNC: 8.1 GM/DL (ref 6.4–8.3)
RBC # BLD AUTO: 3.78 X10(6)/MCL (ref 4.7–6.1)
SARS-COV-2 RNA RESP QL NAA+PROBE: NOT DETECTED
SODIUM SERPL-SCNC: 137 MMOL/L (ref 136–145)
TROPONIN I SERPL-MCNC: <0.01 NG/ML (ref 0–0.04)
WBC # SPEC AUTO: 7.1 X10(3)/MCL (ref 4.5–11.5)

## 2023-02-26 PROCEDURE — 84484 ASSAY OF TROPONIN QUANT: CPT | Performed by: PHYSICIAN ASSISTANT

## 2023-02-26 PROCEDURE — 85025 COMPLETE CBC W/AUTO DIFF WBC: CPT | Performed by: PHYSICIAN ASSISTANT

## 2023-02-26 PROCEDURE — 94640 AIRWAY INHALATION TREATMENT: CPT

## 2023-02-26 PROCEDURE — 82550 ASSAY OF CK (CPK): CPT | Performed by: PHYSICIAN ASSISTANT

## 2023-02-26 PROCEDURE — 82553 CREATINE MB FRACTION: CPT | Performed by: PHYSICIAN ASSISTANT

## 2023-02-26 PROCEDURE — 0240U COVID/FLU A&B PCR: CPT | Performed by: PHYSICIAN ASSISTANT

## 2023-02-26 PROCEDURE — 25000242 PHARM REV CODE 250 ALT 637 W/ HCPCS: Performed by: PHYSICIAN ASSISTANT

## 2023-02-26 PROCEDURE — 80053 COMPREHEN METABOLIC PANEL: CPT | Performed by: PHYSICIAN ASSISTANT

## 2023-02-26 PROCEDURE — 99284 EMERGENCY DEPT VISIT MOD MDM: CPT | Mod: 25

## 2023-02-26 RX ORDER — BENZONATATE 100 MG/1
100 CAPSULE ORAL 3 TIMES DAILY PRN
Qty: 15 CAPSULE | Refills: 0 | Status: SHIPPED | OUTPATIENT
Start: 2023-02-26 | End: 2023-03-03

## 2023-02-26 RX ORDER — IPRATROPIUM BROMIDE AND ALBUTEROL SULFATE 2.5; .5 MG/3ML; MG/3ML
9 SOLUTION RESPIRATORY (INHALATION)
Status: COMPLETED | OUTPATIENT
Start: 2023-02-26 | End: 2023-02-26

## 2023-02-26 RX ORDER — ALBUTEROL SULFATE 90 UG/1
1-2 AEROSOL, METERED RESPIRATORY (INHALATION) EVERY 6 HOURS PRN
Qty: 8 G | Refills: 0 | Status: SHIPPED | OUTPATIENT
Start: 2023-02-26 | End: 2024-02-26

## 2023-02-26 RX ADMIN — IPRATROPIUM BROMIDE AND ALBUTEROL SULFATE 9 ML: 2.5; .5 SOLUTION RESPIRATORY (INHALATION) at 12:02

## 2023-02-26 NOTE — DISCHARGE INSTRUCTIONS
You have a new lung nodule on your chest xray. You have been counseled to follow up with PCP for further evaluation and possibly more imaging.

## 2023-02-26 NOTE — ED PROVIDER NOTES
Encounter Date: 2/26/2023       History     Chief Complaint   Patient presents with    Shortness of Breath     Gucci Wooten is a 52 y.o. male who presents to the ED with complaints of chest pain, shortness of breath, and cough that started 3 day(s) ago. He reports he is wheezing and has continued to smoke. He states he has a history of HIV and has been off of his medication x 4 months. States he doesn't need me to refill today.  Denies fever, chills, nasal congestion, and known sick contacts.       The history is provided by the patient. No  was used.   Review of patient's allergies indicates:   Allergen Reactions    Acetaminophen      Other reaction(s): vomiting    Iodine      Other reaction(s): stomach cramps    Shellfish containing products      Other reaction(s): stomach cramps     No past medical history on file.  No past surgical history on file.  No family history on file.     Review of Systems   Constitutional:  Negative for chills, fatigue and fever.   HENT:  Negative for congestion, ear pain, sinus pain and sore throat.    Eyes:  Negative for pain.   Respiratory:  Positive for cough and shortness of breath. Negative for chest tightness.    Cardiovascular:  Positive for chest pain.   Gastrointestinal:  Negative for abdominal pain, constipation, diarrhea, nausea and vomiting.   Genitourinary:  Negative for dysuria.   Musculoskeletal:  Negative for back pain and joint swelling.   Skin:  Negative for color change and rash.   Neurological:  Negative for dizziness and weakness.   Psychiatric/Behavioral:  Negative for behavioral problems and confusion.      Physical Exam     Initial Vitals [02/26/23 1029]   BP Pulse Resp Temp SpO2   128/86 (!) 56 16 98.4 °F (36.9 °C) 99 %      MAP       --         Physical Exam    Nursing note and vitals reviewed.  Constitutional: He appears well-developed and well-nourished.   HENT:   Head: Normocephalic and atraumatic.   Right Ear: External ear normal.    Left Ear: External ear normal.   Nose: Nose normal.   Mouth/Throat: Oropharynx is clear and moist.   Eyes: Conjunctivae and EOM are normal. Pupils are equal, round, and reactive to light.   Neck: Neck supple. No JVD present.   Normal range of motion.  Cardiovascular:  Normal rate, regular rhythm, normal heart sounds and intact distal pulses.           No murmur heard.  Pulmonary/Chest: No respiratory distress. He has wheezes in the right upper field and the left upper field. He has no rhonchi. He has no rales.   Abdominal: Abdomen is soft. Bowel sounds are normal. He exhibits no distension. There is no abdominal tenderness. There is no rebound and no guarding.   Musculoskeletal:         General: Normal range of motion.      Cervical back: Normal range of motion and neck supple.     Lymphadenopathy:     He has no cervical adenopathy.   Neurological: He is alert and oriented to person, place, and time. GCS score is 15. GCS eye subscore is 4. GCS verbal subscore is 5. GCS motor subscore is 6.   Skin: Skin is warm. Capillary refill takes less than 2 seconds.   Psychiatric: He has a normal mood and affect. Thought content normal.       ED Course   Procedures  Labs Reviewed   COMPREHENSIVE METABOLIC PANEL - Abnormal; Notable for the following components:       Result Value    Albumin Level 3.3 (*)     Globulin 4.8 (*)     Albumin/Globulin Ratio 0.7 (*)     All other components within normal limits   CK - Abnormal; Notable for the following components:    Creatine Kinase 411 (*)     All other components within normal limits   CBC WITH DIFFERENTIAL - Abnormal; Notable for the following components:    RBC 3.78 (*)     Hgb 12.4 (*)     Hct 36.9 (*)     MCV 97.6 (*)     All other components within normal limits   TROPONIN I - Normal   CK-MB - Normal   COVID/FLU A&B PCR - Normal    Narrative:     The Xpert Xpress SARS-CoV-2/FLU/RSV plus is a rapid, multiplexed real-time PCR test intended for the simultaneous qualitative  detection and differentiation of SARS-CoV-2, Influenza A, Influenza B, and respiratory syncytial virus (RSV) viral RNA in either nasopharyngeal swab or nasal swab specimens.         CBC W/ AUTO DIFFERENTIAL    Narrative:     The following orders were created for panel order CBC auto differential.  Procedure                               Abnormality         Status                     ---------                               -----------         ------                     CBC with Differential[[id number]]        Abnormal            Final result                 Please view results for these tests on the individual orders.          Imaging Results              X-Ray Chest PA And Lateral (Final result)  Result time 02/26/23 12:16:04      Final result by Darron Mckeon MD (02/26/23 12:16:04)                   Impression:      No acute abnormality.  Nodular opacity in the right mid lung which is new when compared to the prior examination.      Electronically signed by: Darron Mckeon MD  Date:    02/26/2023  Time:    12:16               Narrative:    EXAMINATION:  XR CHEST PA AND LATERAL    CLINICAL HISTORY:  Shortness of breath    TECHNIQUE:  PA and lateral views of the chest were performed.    COMPARISON:  08/08/2021    FINDINGS:  The lungs are clear, with normal appearance of pulmonary vasculature and no pleural effusion or pneumothorax.    The cardiac silhouette is normal in size. The hilar and mediastinal contours are unremarkable.  Nodular opacity in the mid lung    Bones are intact.                                       Medications   albuterol-ipratropium 2.5 mg-0.5 mg/3 mL nebulizer solution 9 mL (9 mLs Nebulization Given 2/26/23 1242)                 ED Course as of 02/26/23 1340   Sun Feb 26, 2023   1323 Reassessed patient at this time. He is laying comfortably in the exam bed. Wheezing has completely resolved after DuoNeb given in ED. Discussed lab results, chest xray, diagnosis, and treatment plan. I had a lengthy  discussion with patient about the new nodule that is in his right lung seen on chest xray and discussed the importance of following up with PCP for further evaluation. He verbalized understanding.  [VJ]      ED Course User Index  [VJ] Tamiko Nguyễn PA-C                 Clinical Impression:   Final diagnoses:  [R06.02] Shortness of breath  [J06.9] Upper respiratory tract infection, unspecified type (Primary)  [R91.1] Lung nodule        ED Disposition Condition    Discharge Stable          ED Prescriptions       Medication Sig Dispense Start Date End Date Auth. Provider    albuterol (PROVENTIL/VENTOLIN HFA) 90 mcg/actuation inhaler Inhale 1-2 puffs into the lungs every 6 (six) hours as needed for Wheezing. Rescue 8 g 2/26/2023 2/26/2024 Tamiko Nguyễn PA-C    benzonatate (TESSALON) 100 MG capsule Take 1 capsule (100 mg total) by mouth 3 (three) times daily as needed for Cough. 15 capsule 2/26/2023 3/3/2023 Tamiko Nguyễn PA-C          Follow-up Information       Follow up With Specialties Details Why Contact Info    [facility]  In 1 week  [address] [address]    [facility] Emergency Medicine In 3 days As needed, If symptoms worsen [address] [address] [phone]             Tamiko Nguyễn PA-C  02/26/23 7773

## 2023-03-08 NOTE — TELEPHONE ENCOUNTER
Attempted to contact patient to reschedule missed B20 intake on 01/24/2023. Phone has a busy signal at this time. Unable to reach letter mailed to patient.

## 2023-04-06 ENCOUNTER — TELEPHONE (OUTPATIENT)
Dept: INFECTIOUS DISEASES | Facility: CLINIC | Age: 53
End: 2023-04-06
Payer: MEDICAID

## 2023-04-06 NOTE — TELEPHONE ENCOUNTER
Attempted to contact Adelia at [facility] to schedule B20 intake. No answer. Voicemail left to call the clinic back.

## 2023-04-06 NOTE — TELEPHONE ENCOUNTER
Adelia called the clinic back stating that patient will need to be scheduled for an appointment. Informed her that he has not been seen in the clinic since 2021 (last labs done at that time). She stated patient changes his phone number every month so it is very hard to reach him for an appointment. She also stated that patient mentioned he never received any letters that were mailed to him previously. Patient's address confirmed. Adelia stated to hold off on scheduling patient a return to care intake at this time since he has been noncompliant with his appointments and labs. Patient is to meet with her next week and she will speak to him then about his appointment/labs. Adelia will keep the clinic updated as to when patient is ready to schedule. Understanding voiced.

## 2023-04-06 NOTE — TELEPHONE ENCOUNTER
----- Message from Priscila Floyd sent at 4/5/2023  2:07 PM CDT -----  Regarding: Appt  Mary Jane Delvalle w/[facility] called to schedule appt for pt. Noticed that he no showed an appt w/you on 1/24. Please advise.  [phone]

## 2023-05-11 ENCOUNTER — CLINICAL SUPPORT (OUTPATIENT)
Dept: INFECTIOUS DISEASES | Facility: CLINIC | Age: 53
End: 2023-05-11
Payer: MEDICAID

## 2023-05-11 DIAGNOSIS — B20 HIV DISEASE: ICD-10-CM

## 2023-05-11 LAB
EST. AVERAGE GLUCOSE BLD GHB EST-MCNC: 114 MG/DL
HBA1C MFR BLD: 5.6 %
PSA SERPL-MCNC: 1.4 NG/ML
T PALLIDUM AB SER QL: REACTIVE

## 2023-05-11 PROCEDURE — 86778 TOXOPLASMA ANTIBODY IGM: CPT | Mod: 90

## 2023-05-11 PROCEDURE — 84153 ASSAY OF PSA TOTAL: CPT

## 2023-05-11 PROCEDURE — 83036 HEMOGLOBIN GLYCOSYLATED A1C: CPT

## 2023-05-11 PROCEDURE — 36415 COLL VENOUS BLD VENIPUNCTURE: CPT

## 2023-05-11 PROCEDURE — 86777 TOXOPLASMA ANTIBODY: CPT | Mod: 90

## 2023-05-11 PROCEDURE — 86780 TREPONEMA PALLIDUM: CPT | Mod: 90

## 2023-05-11 PROCEDURE — 86592 SYPHILIS TEST NON-TREP QUAL: CPT

## 2023-05-11 PROCEDURE — 86780 TREPONEMA PALLIDUM: CPT

## 2023-05-11 PROCEDURE — 86361 T CELL ABSOLUTE COUNT: CPT

## 2023-05-12 LAB
RPR SER QL: NORMAL
RPR SER-TITR: NORMAL {TITER}
T GONDII IGG SER QL IA: POSITIVE
T GONDII IGG SER-ACNC: 100 IU/ML
T GONDII IGM SERPL QL IA: NEGATIVE

## 2023-05-15 ENCOUNTER — TELEPHONE (OUTPATIENT)
Dept: INFECTIOUS DISEASES | Facility: CLINIC | Age: 53
End: 2023-05-15
Payer: MEDICAID

## 2023-05-15 DIAGNOSIS — B20 HIV DISEASE: Primary | ICD-10-CM

## 2023-05-15 LAB
AGE: 54
CD3+CD4+ CELLS # SPEC: 118.62 UNIT/L (ref 589–1505)
CD3+CD4+ CELLS NFR BLD: 19.3 %
LYMPHOCYTES # BLD AUTO: 614.6 X10(3)/MCL (ref 1260–5520)
LYMPHOCYTES NFR LN MANUAL: 14 % (ref 28–48)
LYMPHOMA - T-CELL MARKERS SPEC-IMP: ABNORMAL
T PALLIDUM AB SER QL: REACTIVE
WBC # BLD AUTO: 4390 /MM3 (ref 4500–11500)

## 2023-05-15 NOTE — TELEPHONE ENCOUNTER
Adelia returned call and stated she will try and reach out to pt and have him call us back for additional labs, lab results and update his pharmacy.  Additional orders proposed, please review.

## 2023-05-15 NOTE — TELEPHONE ENCOUNTER
----- Message from WALI Hurt sent at 5/15/2023  2:16 PM CDT -----  Reviewed labs. CD4 118. Pt needs to start Bactrim DS 1 po q day. Please find out where he would like me to send the rx and he needs to schedule an appt.

## 2023-05-15 NOTE — PROGRESS NOTES
Reviewed labs. CD4 118. Pt needs to start Bactrim DS 1 po q day. Please find out where he would like me to send the rx and he needs to schedule an appt.

## 2023-05-30 ENCOUNTER — HOSPITAL ENCOUNTER (EMERGENCY)
Facility: HOSPITAL | Age: 53
Discharge: HOME | End: 2023-05-30
Attending: STUDENT IN AN ORGANIZED HEALTH CARE EDUCATION/TRAINING PROGRAM
Payer: MEDICAID

## 2023-05-30 VITALS
DIASTOLIC BLOOD PRESSURE: 82 MMHG | WEIGHT: 141.13 LBS | TEMPERATURE: 98 F | BODY MASS INDEX: 19.76 KG/M2 | SYSTOLIC BLOOD PRESSURE: 138 MMHG | HEART RATE: 63 BPM | RESPIRATION RATE: 16 BRPM | HEIGHT: 71 IN | OXYGEN SATURATION: 100 %

## 2023-05-30 DIAGNOSIS — M54.12 CERVICAL RADICULOPATHY: Primary | ICD-10-CM

## 2023-05-30 PROCEDURE — 99284 EMERGENCY DEPT VISIT MOD MDM: CPT

## 2023-05-30 PROCEDURE — 25000003 PHARM REV CODE 250: Performed by: PHYSICIAN ASSISTANT

## 2023-05-30 RX ORDER — DICLOFENAC SODIUM 75 MG/1
75 TABLET, DELAYED RELEASE ORAL 2 TIMES DAILY PRN
Qty: 15 TABLET | Refills: 0 | Status: SHIPPED | OUTPATIENT
Start: 2023-05-30 | End: 2023-11-15 | Stop reason: SDUPTHER

## 2023-05-30 RX ORDER — CYCLOBENZAPRINE HCL 10 MG
10 TABLET ORAL 3 TIMES DAILY PRN
Qty: 20 TABLET | Refills: 0 | Status: SHIPPED | OUTPATIENT
Start: 2023-05-30 | End: 2023-06-06

## 2023-05-30 RX ORDER — CYCLOBENZAPRINE HCL 10 MG
10 TABLET ORAL
Status: COMPLETED | OUTPATIENT
Start: 2023-05-30 | End: 2023-05-30

## 2023-05-30 RX ADMIN — CYCLOBENZAPRINE 10 MG: 10 TABLET, FILM COATED ORAL at 12:05

## 2023-05-30 NOTE — ED PROVIDER NOTES
Encounter Date: 5/30/2023       History     Chief Complaint   Patient presents with    Shoulder Pain     Pt w co lt shoulder pain w movement > 1 month.  Denies injury.       51 yo M w/ PMHx significant for chronic neck pain, smoking & HIV presents to ED c/o over 1 month hx of L shoulder & L upper arm pain. Denies any known injury or other inciting event. Reports he has not tried to take any medicine for his symptoms, as he wasn't sure if they would help. Denies joint swelling/erythema, decreased ROM, neck pain, numbness, paresthesia, paralysis, focal weakness, HA, dizziness, CP, SOB, palpitations, diaphoresis, syncope, F/C. VSS on arrival, patient in NAD.    Review of patient's allergies indicates:   Allergen Reactions    Acetaminophen      Other reaction(s): vomiting    Iodine      Other reaction(s): stomach cramps    Shellfish containing products      Other reaction(s): stomach cramps     Past Medical History:   Diagnosis Date    Immune deficiency disorder      History reviewed. No pertinent surgical history.  History reviewed. No pertinent family history.  Social History     Tobacco Use    Smoking status: Every Day     Types: Cigarettes    Smokeless tobacco: Never     Review of Systems   All other systems reviewed and are negative.    Physical Exam     Initial Vitals [05/30/23 1015]   BP Pulse Resp Temp SpO2   138/82 63 16 97.9 °F (36.6 °C) 100 %      MAP       --         Physical Exam    Nursing note and vitals reviewed.  Constitutional: He appears well-developed and well-nourished. He is not diaphoretic. No distress.   HENT:   Head: Normocephalic and atraumatic.   Eyes: Conjunctivae and EOM are normal. Pupils are equal, round, and reactive to light.   Neck: Neck supple.   Normal range of motion.   Full passive range of motion without pain.     Cardiovascular:  Normal rate, regular rhythm, normal heart sounds and intact distal pulses.     Exam reveals no gallop and no friction rub.       No murmur  heard.  Pulmonary/Chest: Breath sounds normal. No respiratory distress. He has no wheezes. He has no rhonchi. He has no rales.   Abdominal: Abdomen is soft. He exhibits no distension. There is no abdominal tenderness. There is no rebound and no guarding.   Musculoskeletal:         General: No edema. Normal range of motion.      Left shoulder: Normal.      Left upper arm: Tenderness present. No swelling, edema, deformity or bony tenderness.      Left wrist: Normal pulse.      Left hand: Normal strength. Normal sensation.      Cervical back: Full passive range of motion without pain, normal range of motion and neck supple. No edema or rigidity. No spinous process tenderness or muscular tenderness. Normal range of motion.     Lymphadenopathy:     He has no cervical adenopathy.   Neurological: He is alert and oriented to person, place, and time. He has normal strength. He displays normal reflexes. No cranial nerve deficit or sensory deficit.   Skin: Skin is warm and dry. Capillary refill takes less than 2 seconds. No rash noted. No pallor.   Psychiatric: He has a normal mood and affect.       ED Course   Procedures  Labs Reviewed - No data to display       Imaging Results              X-Ray Shoulder 2 or More Views Left (Final result)  Result time 05/30/23 12:00:24      Final result by Ramos Santoyo MD (05/30/23 12:00:24)                   Impression:      Slight elevation of the clavicle in relation to the acromion may indicate a mild degree of acromioclavicular injury.      Electronically signed by: Ramos Santoyo  Date:    05/30/2023  Time:    12:00               Narrative:    EXAMINATION:  XR SHOULDER COMPLETE 2 OR MORE VIEWS LEFT    CLINICAL HISTORY:  shoulder pain;    COMPARISON:  None.    FINDINGS:  There is minimal elevation of the clavicle in relation to the acromion which may indicate a degree of acromioclavicular injury clinical correlation is suggested    No acute displaced fractures or  dislocations.    Articular spaces are otherwise preserved with smooth articular surfaces    No blastic or lytic lesions.    Soft tissues within normal limits.                                       X-Ray Cervical Spine 2 or 3 Views (Final result)  Result time 05/30/23 11:42:33      Final result by Ramos Santoyo MD (05/30/23 11:42:33)                   Impression:      Some straightening of the normal curvature of the cervical spine.    Degenerative changes extending from C3-C6      Electronically signed by: Ramos Santoyo  Date:    05/30/2023  Time:    11:42               Narrative:    EXAMINATION:  XR CERVICAL SPINE 2 OR 3 VIEWS    CLINICAL HISTORY:  radicular pain;    TECHNIQUE:  AP, lateral and open mouth views of the cervical spine were performed.    COMPARISON:  None.    FINDINGS:  There is some straightening of the normal curvature of the cervical spine vertebral bodies are normal height with some degenerative changes with slight retrolisthesis of C3 on C4 and C4 on C5 of approximately 3-4 mm there is narrowing at the C3-C4 C4-C5 C5-C6 levels.    No acute fractures or dislocations identified the prevertebral soft tissues appear to be unremarkable                                       Medications   cyclobenzaprine tablet 10 mg (10 mg Oral Given 5/30/23 1213)     Medical Decision Making:   Clinical Tests:   Radiological Study: Ordered and Reviewed  XRs reveal degenerative changes of cervical spine & L shoulder, no acute osseous abnormality. Physical exam unremarkable w/o signs of spinal cord compression or meningeal signs. Presentation consistent w/ cervical radiculopathy. Patient is non-toxic appearing w/ normal vitals, stable for discharge. Will give meds in ED prior to discharge & send home w/ meds for home. Referral placed to IM clinic for follow-up. ED precautions given for new or worsening symptoms.     APC / Resident Notes:   I was not physically present during the history, exam or disposition of  this patient. I was available at all times for consultation. (Neyda)                   Clinical Impression:   Final diagnoses:  [M54.12] Cervical radiculopathy (Primary)        ED Disposition Condition    Discharge Good          ED Prescriptions       Medication Sig Dispense Start Date End Date Auth. Provider    cyclobenzaprine (FLEXERIL) 10 MG tablet Take 1 tablet (10 mg total) by mouth 3 (three) times daily as needed for Muscle spasms. 20 tablet 5/30/2023 6/6/2023 HERI Norris    diclofenac (VOLTAREN) 75 MG EC tablet Take 1 tablet (75 mg total) by mouth 2 (two) times daily as needed (pain). 15 tablet 5/30/2023 -- HERI Norris          Follow-up Information       Follow up With Specialties Details Why Contact Info Additional Information    WALI Hurt Infectious Diseases Schedule an appointment as soon as possible for a visit   [facility] [address]  [phone]       [facility] - Internal Medicine Internal Medicine In 2 weeks  [facility] [id number] [phone] Internal Medicine Clinic Entrance #1    [facility] - Emergency Dept Emergency Medicine  As needed, If symptoms worsen [address] [place] [id number] [phone]              HERI Norris  05/30/23 1200       Brian Faye MD  05/30/23 2202

## 2023-05-30 NOTE — DISCHARGE INSTRUCTIONS
Report to Emergency Department if symptoms return or worsen; [facility] - Medicine Clinic Within 1 to 2 days, It is important that you follow up with your primary care provider or specialist if indicated for further evaluation, workup, and treatment as necessary. The exam and treatment you received in Emergency Department was for an urgent problem and NOT INTENDED AS COMPLETE CARE. It is important that you FOLLOW UP with a doctor for ongoing care. If your symptoms become WORSE or you DO NOT IMPROVE and you are unable to reach your health care provider, you should RETURN to the Emergency Department. The Emergency Department provider has provided a PRELIMINARY INTERPRETATION of all your studies. A final interpretation may be done after you are discharged. If a change in your diagnosis or treatment is needed WE WILL CONTACT YOU. It is critical that we have a CURRENT PHONE NUMBER FOR YOU.

## 2023-11-15 ENCOUNTER — HOSPITAL ENCOUNTER (EMERGENCY)
Facility: HOSPITAL | Age: 53
Discharge: HOME | End: 2023-11-15
Attending: INTERNAL MEDICINE
Payer: MEDICAID

## 2023-11-15 VITALS
OXYGEN SATURATION: 99 % | WEIGHT: 141 LBS | BODY MASS INDEX: 19.67 KG/M2 | TEMPERATURE: 98 F | DIASTOLIC BLOOD PRESSURE: 84 MMHG | RESPIRATION RATE: 18 BRPM | SYSTOLIC BLOOD PRESSURE: 133 MMHG | HEART RATE: 77 BPM

## 2023-11-15 DIAGNOSIS — B20 HISTORY OF HIV INFECTION: ICD-10-CM

## 2023-11-15 DIAGNOSIS — H66.92 LEFT OTITIS MEDIA, UNSPECIFIED OTITIS MEDIA TYPE: Primary | ICD-10-CM

## 2023-11-15 PROCEDURE — 25000003 PHARM REV CODE 250: Performed by: PHYSICIAN ASSISTANT

## 2023-11-15 PROCEDURE — 99284 EMERGENCY DEPT VISIT MOD MDM: CPT

## 2023-11-15 RX ORDER — AMOXICILLIN AND CLAVULANATE POTASSIUM 875; 125 MG/1; MG/1
1 TABLET, FILM COATED ORAL 2 TIMES DAILY
Qty: 14 TABLET | Refills: 0 | Status: SHIPPED | OUTPATIENT
Start: 2023-11-15

## 2023-11-15 RX ORDER — IBUPROFEN 600 MG/1
600 TABLET ORAL
Status: COMPLETED | OUTPATIENT
Start: 2023-11-15 | End: 2023-11-15

## 2023-11-15 RX ORDER — DICLOFENAC SODIUM 75 MG/1
75 TABLET, DELAYED RELEASE ORAL 2 TIMES DAILY PRN
Qty: 15 TABLET | Refills: 0 | Status: SHIPPED | OUTPATIENT
Start: 2023-11-15

## 2023-11-15 RX ADMIN — IBUPROFEN 600 MG: 600 TABLET, FILM COATED ORAL at 05:11

## 2023-11-15 NOTE — ED PROVIDER NOTES
Encounter Date: 11/15/2023       History     Chief Complaint   Patient presents with    Otalgia     Left ear pain 2-3 days, no fever or drainage.      53-year-old male with past medical history significant for HIV and smoking presents to ED complaining of 3 day history left ear pain.  Patient reports he has been off his HIV meds for approximately 6 months as he is homeless.  Denies hearing changes, tinnitus, congestion, rhinorrhea, sore throat, dental pain, fever, chills, unintended weight loss, night sweats, abdominal pain, nausea, vomiting, rash, cough, hemoptysis.  Vital signs stable on arrival, patient in no acute distress.      Review of patient's allergies indicates:   Allergen Reactions    Acetaminophen      Other reaction(s): vomiting    Iodine      Other reaction(s): stomach cramps    Shellfish containing products      Other reaction(s): stomach cramps     Past Medical History:   Diagnosis Date    Immune deficiency disorder      No past surgical history on file.  No family history on file.  Social History     Tobacco Use    Smoking status: Every Day     Types: Cigarettes    Smokeless tobacco: Never     Review of Systems   All other systems reviewed and are negative.      Physical Exam     Initial Vitals [11/15/23 1654]   BP Pulse Resp Temp SpO2   133/84 77 18 98.1 °F (36.7 °C) 99 %      MAP       --         Physical Exam    Nursing note and vitals reviewed.  Constitutional: He appears well-developed and well-nourished. He is not diaphoretic. No distress.   HENT:   Head: Normocephalic and atraumatic.   Right Ear: Hearing, tympanic membrane, external ear and ear canal normal. No mastoid tenderness.   Left Ear: Hearing, external ear and ear canal normal. No drainage, swelling or tenderness. No foreign bodies. No mastoid tenderness. Tympanic membrane is injected and erythematous. Tympanic membrane is not perforated, not retracted and not bulging.  No middle ear effusion. No hemotympanum. No decreased hearing is  noted.   Nose: Nose normal.   Mouth/Throat: Oropharynx is clear and moist. No oropharyngeal exudate.   Eyes: Conjunctivae and EOM are normal. Pupils are equal, round, and reactive to light. Right eye exhibits no discharge. Left eye exhibits no discharge. No scleral icterus.   Neck: Neck supple.   Normal range of motion.   Full passive range of motion without pain.     Cardiovascular:  Normal rate, regular rhythm, normal heart sounds and intact distal pulses.     Exam reveals no gallop and no friction rub.       No murmur heard.  Pulmonary/Chest: Breath sounds normal. No respiratory distress. He has no wheezes. He has no rhonchi. He has no rales.   Abdominal: Abdomen is soft. Bowel sounds are normal. He exhibits no distension. There is no abdominal tenderness. There is no rebound and no guarding.   Musculoskeletal:         General: No tenderness or edema. Normal range of motion.      Cervical back: Full passive range of motion without pain, normal range of motion and neck supple. No rigidity. Normal range of motion.     Lymphadenopathy:     He has no cervical adenopathy.   Neurological: He is alert and oriented to person, place, and time. He has normal strength. No cranial nerve deficit or sensory deficit. GCS score is 15. GCS eye subscore is 4. GCS verbal subscore is 5. GCS motor subscore is 6.   Skin: Skin is warm and dry. Capillary refill takes less than 2 seconds. No rash noted. No pallor.   Psychiatric: He has a normal mood and affect.         ED Course   Procedures  Labs Reviewed - No data to display       Imaging Results    None          Medications   ibuprofen tablet 600 mg (has no administration in time range)     Medical Decision Making  Differential diagnosis:  Includes but not limited to otitis media, COVID, flu, other viral illness    ED management: I will give patient 600 mg dose of ibuprofen for pain.    ED course: I offered patient COVID and flu testing but he reports he does not want these at this  time.  I discussed at length with patient why he is not taken HIV medication and that he can come here to get his medications even if he is homeless.  Patient however reports that he is not sure what his previous medications were and I am unable to see in the chart what his HIV medications were.  Patient is nontoxic appearing with normal vitals and aside from left ear pain denies all concerning symptoms, no indication for further workup at this time.  However, I will place urgent referral back to ID clinic and have employed patient to call clinic as soon as possible to schedule follow up appointment so we can get back on his medications.  Strict ED precautions given for new or worsening symptoms and patient verbalized understanding.                               Clinical Impression:   Final diagnoses:  [H66.92] Left otitis media, unspecified otitis media type (Primary)  [B20] History of HIV infection        ED Disposition Condition    Discharge Good          ED Prescriptions       Medication Sig Dispense Start Date End Date Auth. Provider    amoxicillin-clavulanate 875-125mg (AUGMENTIN) 875-125 mg per tablet Take 1 tablet by mouth 2 (two) times daily. 14 tablet 11/15/2023 -- Maximiliano Brooks PA    diclofenac (VOLTAREN) 75 MG EC tablet Take 1 tablet (75 mg total) by mouth 2 (two) times daily as needed (pain). 15 tablet 11/15/2023 -- Maximiliano Brooks PA          Follow-up Information       Follow up With Specialties Details Why Contact Info    Angelique Kay FNP Infectious Diseases Call in 1 day  [address] [facility] [id number] [phone]      [facility] - Emergency Dept Emergency Medicine  As needed, If symptoms worsen [address] [facility] [address] [phone]             Maximiliano Brooks PA  11/15/23 7769

## 2023-11-15 NOTE — DISCHARGE INSTRUCTIONS
Report to Emergency Department if symptoms return or worsen; [facility] - Medicine Clinic Within 1 to 2 days, It is important that you follow up with your primary care provider or specialist if indicated for further evaluation, workup, and treatment as necessary. The exam and treatment you received in Emergency Department was for an urgent problem and NOT INTENDED AS COMPLETE CARE. It is important that you FOLLOW UP with a doctor for ongoing care. If your symptoms become WORSE or you DO NOT IMPROVE and you are unable to reach your health care provider, you should RETURN to the Emergency Department. The Emergency Department provider has provided a PRELIMINARY INTERPRETATION of all your studies. A final interpretation may be done after you are discharged. If a change in your diagnosis or treatment is needed WE WILL CONTACT YOU. It is critical that we have a CURRENT PHONE NUMBER FOR YOU.

## 2024-04-14 ENCOUNTER — HOSPITAL ENCOUNTER (INPATIENT)
Facility: HOSPITAL | Age: 54
LOS: 1 days | Discharge: LEFT BEFORE DISCHARGE | DRG: 975 | End: 2024-04-15
Attending: INTERNAL MEDICINE | Admitting: INTERNAL MEDICINE
Payer: MEDICAID

## 2024-04-14 DIAGNOSIS — J45.901 MODERATE ASTHMA WITH ACUTE EXACERBATION, UNSPECIFIED WHETHER PERSISTENT: ICD-10-CM

## 2024-04-14 DIAGNOSIS — R06.02 SHORTNESS OF BREATH: ICD-10-CM

## 2024-04-14 DIAGNOSIS — J20.8 ACUTE BRONCHITIS, BACTERIAL: ICD-10-CM

## 2024-04-14 DIAGNOSIS — B20 HISTORY OF HIV INFECTION: Primary | ICD-10-CM

## 2024-04-14 DIAGNOSIS — B96.89 ACUTE BRONCHITIS, BACTERIAL: ICD-10-CM

## 2024-04-14 DIAGNOSIS — R07.9 CHEST PAIN: ICD-10-CM

## 2024-04-14 PROBLEM — A41.9 SEPSIS: Status: ACTIVE | Noted: 2024-04-14

## 2024-04-14 LAB
APPEARANCE UR: CLEAR
BACTERIA #/AREA URNS AUTO: ABNORMAL /HPF
BILIRUB UR QL STRIP.AUTO: NEGATIVE
COLOR UR AUTO: ABNORMAL
EST. AVERAGE GLUCOSE BLD GHB EST-MCNC: 108.3 MG/DL
FLUAV AG UPPER RESP QL IA.RAPID: NOT DETECTED
FLUBV AG UPPER RESP QL IA.RAPID: NOT DETECTED
GLUCOSE UR QL STRIP.AUTO: NORMAL
HBA1C MFR BLD: 5.4 %
HOLD SPECIMEN: NORMAL
HYALINE CASTS #/AREA URNS LPF: ABNORMAL /LPF
KETONES UR QL STRIP.AUTO: NEGATIVE
LACTATE SERPL-SCNC: 1.4 MMOL/L (ref 0.5–2.2)
LDH SERPL-CCNC: 218 U/L (ref 125–220)
LEUKOCYTE ESTERASE UR QL STRIP.AUTO: NEGATIVE
MRSA PCR SCRN (OHS): NOT DETECTED
MUCOUS THREADS URNS QL MICRO: ABNORMAL /LPF
NITRITE UR QL STRIP.AUTO: NEGATIVE
PH UR STRIP.AUTO: 6 [PH]
PROT UR QL STRIP.AUTO: ABNORMAL
RBC #/AREA URNS AUTO: ABNORMAL /HPF
RBC UR QL AUTO: ABNORMAL
RSV A 5' UTR RNA NPH QL NAA+PROBE: NOT DETECTED
SARS-COV-2 RNA RESP QL NAA+PROBE: NOT DETECTED
SP GR UR STRIP.AUTO: 1.02 (ref 1–1.03)
SQUAMOUS #/AREA URNS LPF: ABNORMAL /HPF
T PALLIDUM AB SER QL: REACTIVE
TSH SERPL-ACNC: 1.47 UIU/ML (ref 0.35–4.94)
UROBILINOGEN UR STRIP-ACNC: NORMAL
WBC #/AREA URNS AUTO: ABNORMAL /HPF

## 2024-04-14 PROCEDURE — 87641 MR-STAPH DNA AMP PROBE: CPT | Performed by: STUDENT IN AN ORGANIZED HEALTH CARE EDUCATION/TRAINING PROGRAM

## 2024-04-14 PROCEDURE — 83036 HEMOGLOBIN GLYCOSYLATED A1C: CPT | Performed by: STUDENT IN AN ORGANIZED HEALTH CARE EDUCATION/TRAINING PROGRAM

## 2024-04-14 PROCEDURE — 87536 HIV-1 QUANT&REVRSE TRNSCRPJ: CPT | Performed by: STUDENT IN AN ORGANIZED HEALTH CARE EDUCATION/TRAINING PROGRAM

## 2024-04-14 PROCEDURE — 84443 ASSAY THYROID STIM HORMONE: CPT | Performed by: STUDENT IN AN ORGANIZED HEALTH CARE EDUCATION/TRAINING PROGRAM

## 2024-04-14 PROCEDURE — 86592 SYPHILIS TEST NON-TREP QUAL: CPT | Performed by: STUDENT IN AN ORGANIZED HEALTH CARE EDUCATION/TRAINING PROGRAM

## 2024-04-14 PROCEDURE — 87040 BLOOD CULTURE FOR BACTERIA: CPT | Performed by: INTERNAL MEDICINE

## 2024-04-14 PROCEDURE — 86361 T CELL ABSOLUTE COUNT: CPT | Performed by: INTERNAL MEDICINE

## 2024-04-14 PROCEDURE — 99285 EMERGENCY DEPT VISIT HI MDM: CPT | Mod: 25

## 2024-04-14 PROCEDURE — 21400001 HC TELEMETRY ROOM

## 2024-04-14 PROCEDURE — 83605 ASSAY OF LACTIC ACID: CPT | Performed by: INTERNAL MEDICINE

## 2024-04-14 PROCEDURE — 96365 THER/PROPH/DIAG IV INF INIT: CPT

## 2024-04-14 PROCEDURE — 96367 TX/PROPH/DG ADDL SEQ IV INF: CPT

## 2024-04-14 PROCEDURE — 63600175 PHARM REV CODE 636 W HCPCS

## 2024-04-14 PROCEDURE — 25000003 PHARM REV CODE 250: Performed by: INTERNAL MEDICINE

## 2024-04-14 PROCEDURE — 83615 LACTATE (LD) (LDH) ENZYME: CPT | Performed by: INTERNAL MEDICINE

## 2024-04-14 PROCEDURE — 63600175 PHARM REV CODE 636 W HCPCS: Performed by: STUDENT IN AN ORGANIZED HEALTH CARE EDUCATION/TRAINING PROGRAM

## 2024-04-14 PROCEDURE — 63600175 PHARM REV CODE 636 W HCPCS: Performed by: INTERNAL MEDICINE

## 2024-04-14 PROCEDURE — 0241U COVID/RSV/FLU A&B PCR: CPT | Performed by: INTERNAL MEDICINE

## 2024-04-14 PROCEDURE — 87798 DETECT AGENT NOS DNA AMP: CPT | Performed by: STUDENT IN AN ORGANIZED HEALTH CARE EDUCATION/TRAINING PROGRAM

## 2024-04-14 PROCEDURE — 86780 TREPONEMA PALLIDUM: CPT | Performed by: STUDENT IN AN ORGANIZED HEALTH CARE EDUCATION/TRAINING PROGRAM

## 2024-04-14 PROCEDURE — 25000003 PHARM REV CODE 250: Performed by: STUDENT IN AN ORGANIZED HEALTH CARE EDUCATION/TRAINING PROGRAM

## 2024-04-14 PROCEDURE — 11000001 HC ACUTE MED/SURG PRIVATE ROOM

## 2024-04-14 PROCEDURE — 81001 URINALYSIS AUTO W/SCOPE: CPT | Performed by: INTERNAL MEDICINE

## 2024-04-14 RX ORDER — PREDNISONE 10 MG/1
40 TABLET ORAL DAILY
Status: DISCONTINUED | OUTPATIENT
Start: 2024-04-15 | End: 2024-04-14

## 2024-04-14 RX ORDER — GLUCAGON 1 MG
1 KIT INJECTION
Status: DISCONTINUED | OUTPATIENT
Start: 2024-04-14 | End: 2024-04-15 | Stop reason: HOSPADM

## 2024-04-14 RX ORDER — IBUPROFEN 200 MG
24 TABLET ORAL
Status: DISCONTINUED | OUTPATIENT
Start: 2024-04-14 | End: 2024-04-15 | Stop reason: HOSPADM

## 2024-04-14 RX ORDER — SODIUM CHLORIDE, SODIUM LACTATE, POTASSIUM CHLORIDE, CALCIUM CHLORIDE 600; 310; 30; 20 MG/100ML; MG/100ML; MG/100ML; MG/100ML
INJECTION, SOLUTION INTRAVENOUS CONTINUOUS
Status: DISCONTINUED | OUTPATIENT
Start: 2024-04-14 | End: 2024-04-15 | Stop reason: HOSPADM

## 2024-04-14 RX ORDER — IPRATROPIUM BROMIDE AND ALBUTEROL SULFATE 2.5; .5 MG/3ML; MG/3ML
3 SOLUTION RESPIRATORY (INHALATION) EVERY 4 HOURS PRN
Status: DISCONTINUED | OUTPATIENT
Start: 2024-04-14 | End: 2024-04-15 | Stop reason: HOSPADM

## 2024-04-14 RX ORDER — NALOXONE HCL 0.4 MG/ML
0.02 VIAL (ML) INJECTION
Status: DISCONTINUED | OUTPATIENT
Start: 2024-04-14 | End: 2024-04-15 | Stop reason: HOSPADM

## 2024-04-14 RX ORDER — ENOXAPARIN SODIUM 100 MG/ML
40 INJECTION SUBCUTANEOUS EVERY 24 HOURS
Status: DISCONTINUED | OUTPATIENT
Start: 2024-04-14 | End: 2024-04-15 | Stop reason: HOSPADM

## 2024-04-14 RX ORDER — IBUPROFEN 600 MG/1
600 TABLET ORAL
Status: COMPLETED | OUTPATIENT
Start: 2024-04-14 | End: 2024-04-14

## 2024-04-14 RX ORDER — SODIUM CHLORIDE 0.9 % (FLUSH) 0.9 %
10 SYRINGE (ML) INJECTION EVERY 12 HOURS PRN
Status: DISCONTINUED | OUTPATIENT
Start: 2024-04-14 | End: 2024-04-15 | Stop reason: HOSPADM

## 2024-04-14 RX ORDER — IBUPROFEN 200 MG
16 TABLET ORAL
Status: DISCONTINUED | OUTPATIENT
Start: 2024-04-14 | End: 2024-04-15 | Stop reason: HOSPADM

## 2024-04-14 RX ADMIN — IBUPROFEN 600 MG: 600 TABLET, FILM COATED ORAL at 03:04

## 2024-04-14 RX ADMIN — AZITHROMYCIN 500 MG: 500 INJECTION, POWDER, LYOPHILIZED, FOR SOLUTION INTRAVENOUS at 03:04

## 2024-04-14 RX ADMIN — ENOXAPARIN SODIUM 40 MG: 40 INJECTION SUBCUTANEOUS at 07:04

## 2024-04-14 RX ADMIN — SODIUM CHLORIDE, POTASSIUM CHLORIDE, SODIUM LACTATE AND CALCIUM CHLORIDE 500 ML: 600; 310; 30; 20 INJECTION, SOLUTION INTRAVENOUS at 06:04

## 2024-04-14 RX ADMIN — CEFTRIAXONE SODIUM 1 G: 1 INJECTION, POWDER, FOR SOLUTION INTRAMUSCULAR; INTRAVENOUS at 03:04

## 2024-04-14 RX ADMIN — SODIUM CHLORIDE, POTASSIUM CHLORIDE, SODIUM LACTATE AND CALCIUM CHLORIDE: 600; 310; 30; 20 INJECTION, SOLUTION INTRAVENOUS at 07:04

## 2024-04-14 RX ADMIN — SODIUM CHLORIDE 500 ML: 9 INJECTION, SOLUTION INTRAVENOUS at 03:04

## 2024-04-14 RX ADMIN — VANCOMYCIN HYDROCHLORIDE 750 MG: 750 INJECTION, POWDER, LYOPHILIZED, FOR SOLUTION INTRAVENOUS at 08:04

## 2024-04-14 NOTE — H&P
[facility] Wards   History & Physical Note     Resident Team: [facility] Medicine List 1  Attending Physician: Isabella Murillo*  Date of Admit: 4/14/2024    Chief Complaint:     Shortness of Breath and Wheezing (C/o increased sob and wheezing x 2 weeks. Ems gave neb tx and 125 solumedrol ivp in route. )       Subjective:      History of Present Illness:  Gucci Wooten is a 53 y.o. male who with a history of HIV who presented to [facility] ED on 4/14/2024  with complaint of shortness of breath, cough, fever, and weakness started 2-3 weeks ago along with chest pain that resolved before coming to the ED, his shortness of breath resolved after receiving nebulized albuterol.  The patient used to take albuterol but has been out of medication since he became homeless after the death of his wife over year ago.  The patient denies nausea, vomiting, abdominal pain, diarrhea, constipation, blood in stool, dysuria, or frequency.  The patient smokes 1 pack a day x40 years, uses marijuana and crack cocaine, denies alcohol use.  In the ED patient had a fever of up to 101.1, tachypnea, tachycardia, lactic acid negative, WBC in normal range, borderline hyponatremia 134, otherwise chemistry unremarkable, cardiac markers negative, CXR unremarkable.  Internal medicine was consulted for sepsis in the setting of HIV      Past Medical History:   has a past medical history of Immune deficiency disorder.     Past Surgical History:   has no past surgical history on file.     Family History:  family history is not on file.     Social History:   reports that he has been smoking cigarettes. He has never used smokeless tobacco.     Allergies:  is allergic to acetaminophen, iodine, and shellfish containing products.     Home Medications:  Prior to Admission medications    Medication Sig Start Date End Date Taking? Authorizing Provider   albuterol (PROVENTIL/VENTOLIN HFA) 90 mcg/actuation inhaler Inhale 1-2 puffs into the lungs every 6 (six)  hours as needed for Wheezing. Rescue 2/26/23 2/26/24  Tamiko Cantrell PA-C   amoxicillin-clavulanate 875-125mg (AUGMENTIN) 875-125 mg per tablet Take 1 tablet by mouth 2 (two) times daily. 11/15/23   Maximiliano Brooks PA   diclofenac (VOLTAREN) 75 MG EC tablet Take 1 tablet (75 mg total) by mouth 2 (two) times daily as needed (pain). 11/15/23   Maximiliano Brooks PA         Review of Systems:  Review of Systems   Constitutional:  Positive for chills and fever.   Respiratory:  Positive for cough and shortness of breath.    Cardiovascular:  Positive for chest pain. Negative for orthopnea and leg swelling.   Gastrointestinal:  Negative for abdominal pain, blood in stool, constipation, diarrhea, heartburn, nausea and vomiting.   Genitourinary:  Negative for dysuria and urgency.            Objective:       Vital Signs (Most Recent):  Temp: 100 °F (37.8 °C) (04/14/24 1702)  Pulse: 77 (04/14/24 1716)  Resp: 15 (04/14/24 1716)  BP: 122/75 (04/14/24 1716)  SpO2: 97 % (04/14/24 1716) Vital Signs (24h Range):  Temp:  [100 °F (37.8 °C)-101.1 °F (38.4 °C)] 100 °F (37.8 °C)  Pulse:  [77-99] 77  Resp:  [15-32] 15  SpO2:  [91 %-97 %] 97 %  BP: (119-136)/(72-82) 122/75       Physical Examination:  Physical Exam  Constitutional:       Comments: Disheveled   HENT:      Head: Normocephalic and atraumatic.   Eyes:      Extraocular Movements: Extraocular movements intact.   Cardiovascular:      Rate and Rhythm: Normal rate and regular rhythm.      Pulses: Normal pulses.      Heart sounds: Normal heart sounds.   Pulmonary:      Effort: Pulmonary effort is normal.      Breath sounds: Rhonchi present. No wheezing.   Abdominal:      General: Abdomen is flat. Bowel sounds are normal.      Palpations: Abdomen is soft.   Musculoskeletal:         General: Normal range of motion.      Cervical back: Normal range of motion and neck supple.   Skin:     General: Skin is warm and dry.      Capillary Refill: Capillary refill takes less than 2  seconds.   Neurological:      General: No focal deficit present.      Mental Status: He is alert and oriented to person, place, and time.           Laboratory:  Most Recent Data:  CBC:   Lab Results   Component Value Date    WBC 8.32 04/14/2024    HGB 14.0 04/14/2024    HCT 41.3 (L) 04/14/2024     04/14/2024    MCV 94.5 (H) 04/14/2024    RDW 12.7 04/14/2024     BMP:   Lab Results   Component Value Date     (L) 04/14/2024    K 4.4 04/14/2024    CHLORIDE 102 04/14/2024    CO2 23 04/14/2024    BUN 13.5 04/14/2024    CREATININE 1.04 04/14/2024    GLUCOSE 102 (H) 04/14/2024    CALCIUM 9.5 04/14/2024     LFTs:   Lab Results   Component Value Date    ALBUMIN 3.0 (L) 04/14/2024    BILITOT 0.6 04/14/2024    BILIDIR 0.3 08/08/2021    IBILI 0.70 08/08/2021    AST 24 04/14/2024    ALKPHOS 64 04/14/2024    ALT 18 04/14/2024     Coags:   Lab Results   Component Value Date    INR 1.05 01/27/2020    PROTIME 13.6 01/27/2020    PTT 27.8 01/27/2020     FLP:   Lab Results   Component Value Date    CHOL 178 09/14/2020    HDL 83 (H) 09/14/2020    LDL 83 09/14/2020    TRIG 58 09/14/2020     DM:   Lab Results   Component Value Date    HGBA1C 5.6 05/11/2023    HGBA1C 4.8 05/29/2019    CREATININE 1.04 04/14/2024     Thyroid:   Lab Results   Component Value Date    TSH 1.226 09/14/2020     Anemia:   Lab Results   Component Value Date    IRON 113 02/28/2020    FERRITIN 156.4 02/28/2020    TRANS 280.0 02/28/2020    TIBC 318 02/28/2020     Cardiac:   Lab Results   Component Value Date    TROPONINI 0.015 04/14/2024     (H) 02/26/2023    CPKMB 6.3 02/26/2023    BNP <10.0 04/14/2024     Urinalysis:   Lab Results   Component Value Date    COLORU YELLOW 08/08/2021    PHUA 6.0 04/14/2024    NITRITE Negative 08/08/2021    KETONESU Negative 08/08/2021    UROBILINOGEN Normal 04/14/2024    WBCUA 0-5 04/14/2024       Trended Cardiac Data:  Recent Labs   Lab 04/14/24  1519   TROPONINI 0.015   BNP <10.0           Microbiology  Data:  Pending    Other Results:  EKG (my interpretation): EKG: normal EKG, normal sinus rhythm, unchanged from previous tracings.    Radiology:  Imaging Results              X-Ray Chest AP Portable (Final result)  Result time 04/14/24 15:51:04      Final result by Lewis Wooten MD (04/14/24 15:51:04)                   Impression:      No acute pulmonary process identified.      Electronically signed by: Lewis Wooten  Date:    04/14/2024  Time:    15:51               Narrative:    EXAMINATION:  XR CHEST AP PORTABLE    CLINICAL HISTORY:  Sepsis;    TECHNIQUE:  Frontal view(s) of the chest.    COMPARISON:  Radiography 02/26/2023    FINDINGS:  Normal cardiac silhouette.  The lungs are well-inflated.  No consolidation identified.  No significant pleural effusion or discernible pneumothorax.                                         Lines/Drains/Airways       Peripheral Intravenous Line  Duration                  Peripheral IV - Single Lumen 04/14/24 1712 20 G Left;Posterior Forearm <1 day                     Assessment & Plan:     Sepsis (3/4 SIRS)  HIV  Given 1 L LR, start LR infusion 100 cc/hour  Pending blood cultures  UA and CXR unremarkable, ordering CT chest without contrast  Start vancomycin and azithromycin  HIV viral load and CD4 count pending  Sputum PJP pending  Pending syphilis antibodies    Possible COPD  Patient has prescription albuterol inhaler, had wheezes on presentation, has 40 pack year history smoking  No PFT on file  Responded to DuoNebs route, received Solu-Medrol 125 mg IV, did not have wheezing on my examination  DuoNebs q.4 p.r.n.  Prednisone 40 mg daily  Continue antibiotics    CODE STATUS:  Full  Access:  PIV  Antibiotics:  Vancomycin and azithromycin  Diet:  Heart healthy  DVT Prophylaxis:  Lovenox  GI Prophylaxis:  None  Fluids:   cc/hour      Disposition:  53-year-old male admitted for sepsis with history of HIV, pending blood cultures and CT scan with HIV workup, on IV fluids and  antibiotic coverage with vancomycin azithromycin, patient will need placement, we will talk to case management in the morning.    Sabrina Rosales MD  Internal Medicine - PGY-1

## 2024-04-14 NOTE — ED PROVIDER NOTES
Encounter Date: 2024       History     Chief Complaint   Patient presents with    Shortness of Breath    Wheezing     C/o increased sob and wheezing x 2 weeks. Ems gave neb tx and 125 solumedrol ivp in route.      Presents by EMS due to shortness of breath. States Hx of HIV, not taking any medications because he is homeless. States lose his wife () a little over a year ago after which he have been homeless. Paramedics gave him duo neb x 1 and solumedrol 125 mg IV    The history is provided by the patient and the EMS personnel.     Review of patient's allergies indicates:   Allergen Reactions    Acetaminophen      Other reaction(s): vomiting    Iodine      Other reaction(s): stomach cramps    Shellfish containing products      Other reaction(s): stomach cramps     Past Medical History:   Diagnosis Date    Immune deficiency disorder      No past surgical history on file.  No family history on file.  Social History     Tobacco Use    Smoking status: Every Day     Types: Cigarettes    Smokeless tobacco: Never     Review of Systems   Constitutional:  Positive for chills and fever.   Respiratory:  Positive for cough and shortness of breath.    All other systems reviewed and are negative.      Physical Exam     Initial Vitals [24 1448]   BP Pulse Resp Temp SpO2   125/80 98 (!) 30 (!) 101.1 °F (38.4 °C) (!) 94 %      MAP       --         Physical Exam    Nursing note and vitals reviewed.  Constitutional: He appears well-developed. He appears distressed (Mild to moderate).   Underweight   HENT:   Head: Normocephalic and atraumatic.   Mouth/Throat: Oropharynx is clear and moist. No oropharyngeal exudate.   Eyes: Conjunctivae and EOM are normal. Pupils are equal, round, and reactive to light.   Neck: Neck supple. No thyromegaly present. No JVD present.   Normal range of motion.  Cardiovascular:  Regular rhythm, normal heart sounds and intact distal pulses.           Tachycardic   Pulmonary/Chest: No respiratory  distress. He has wheezes. He has rhonchi.   Abdominal: Abdomen is soft. Bowel sounds are normal. He exhibits no distension. There is no abdominal tenderness. There is no rebound and no guarding.   Musculoskeletal:         General: No edema. Normal range of motion.      Cervical back: Normal range of motion and neck supple.     Lymphadenopathy:     He has cervical adenopathy.   Neurological: He is alert and oriented to person, place, and time. He has normal strength. GCS score is 15. GCS eye subscore is 4. GCS verbal subscore is 5. GCS motor subscore is 6.   Skin: Skin is warm and dry. No rash noted.   Psychiatric: Thought content normal.         ED Course   Procedures  Labs Reviewed   LACTIC ACID, PLASMA - Normal   COVID/RSV/FLU A&B PCR - Normal    Narrative:     The Xpert Xpress SARS-CoV-2/FLU/RSV plus is a rapid, multiplexed real-time PCR test intended for the simultaneous qualitative detection and differentiation of SARS-CoV-2, Influenza A, Influenza B, and respiratory syncytial virus (RSV) viral RNA in either nasopharyngeal swab or nasal swab specimens.         LACTATE DEHYDROGENASE - Normal   BLOOD CULTURE [facility]   BLOOD CULTURE [facility]   URINALYSIS, REFLEX TO URINE CULTURE   CD4 LYMPHOCYTES ([facility])   EXTRA TUBES    Narrative:     The following orders were created for panel order EXTRA TUBES.  Procedure                               Abnormality         Status                     ---------                               -----------         ------                     Light Blue Top Hold[[id number]]                             In process                 Light Green Top Hold[[id number]]                            In process                 Lavender Top Hold[[id number]]                               In process                 Gold Top Hold[[id number]]                                   In process                 Gold Top Hold[[id number]]                                   In process                 Pink Top Hold[[id number]]                                    In process                   Please view results for these tests on the individual orders.   LIGHT BLUE TOP HOLD   LIGHT GREEN TOP HOLD   LAVENDER TOP HOLD   GOLD TOP HOLD   GOLD TOP HOLD   PINK TOP HOLD     EKG Readings: (Independently Interpreted)   Initial Reading: No STEMI. Rhythm: Normal Sinus Rhythm. Heart Rate: 88. Ectopy: No Ectopy. Conduction: Normal. ST Segments: Normal ST Segments. T Waves Flipped: II, III, AVF and V6. Axis: Right Axis Deviation. Clinical Impression: Normal Sinus Rhythm       Imaging Results              X-Ray Chest AP Portable (Final result)  Result time 04/14/24 15:51:04      Final result by Lewis Wooten MD (04/14/24 15:51:04)                   Impression:      No acute pulmonary process identified.      Electronically signed by: Lewis Wooten  Date:    04/14/2024  Time:    15:51               Narrative:    EXAMINATION:  XR CHEST AP PORTABLE    CLINICAL HISTORY:  Sepsis;    TECHNIQUE:  Frontal view(s) of the chest.    COMPARISON:  Radiography 02/26/2023    FINDINGS:  Normal cardiac silhouette.  The lungs are well-inflated.  No consolidation identified.  No significant pleural effusion or discernible pneumothorax.                                       Medications   cefTRIAXone (Rocephin) 1 g in dextrose 5 % in water (D5W) 100 mL IVPB (MB+) (0 g Intravenous Stopped 4/14/24 1550)   azithromycin (ZITHROMAX) 500 mg in dextrose 5 % (D5W) 250 mL IVPB (Vial-Mate) (0 mg Intravenous Stopped 4/14/24 1658)   sodium chloride 0.9% bolus 500 mL 500 mL (0 mLs Intravenous Stopped 4/14/24 1602)   ibuprofen tablet 600 mg (600 mg Oral Given 4/14/24 1529)     Medical Decision Making  Amount and/or Complexity of Data Reviewed  Independent Historian: EMS     Details: Paramedics gave him duo neb x 1 and solumedrol 125 mg IV  Labs: ordered. Decision-making details documented in ED Course.  Radiology: ordered and independent interpretation performed. Decision-making details  documented in ED Course.  Discussion of management or test interpretation with external provider(s): 5:32 PM Consult: I discussed the case with Dr. Jamison ([facility]). Agrees with current management.   Recommends will evaluate in ED      Risk  Prescription drug management.      Additional MDM:   Differential Diagnosis:   Pneumonia, Asthma exacerbation, COPD exacerbation, Pericardial Effusion, Hear Failure, Pulmonary Emboli, Pleural effusion, malignancy, among others                                      Clinical Impression:  Final diagnoses:  [R06.02] Shortness of breath  [B20] History of HIV infection (Primary)  [J45.901] Moderate asthma with acute exacerbation, unspecified whether persistent  [J20.8, B96.89] Acute bronchitis, bacterial          ED Disposition Condition    Observation Stable                Oral Murillo MD  04/14/24 1733       Oral Murillo MD  04/14/24 1733

## 2024-04-15 VITALS
OXYGEN SATURATION: 98 % | HEIGHT: 71 IN | DIASTOLIC BLOOD PRESSURE: 92 MMHG | BODY MASS INDEX: 19.15 KG/M2 | HEART RATE: 59 BPM | WEIGHT: 136.81 LBS | TEMPERATURE: 98 F | SYSTOLIC BLOOD PRESSURE: 144 MMHG | RESPIRATION RATE: 18 BRPM

## 2024-04-15 LAB
AGE: 55
ALBUMIN SERPL-MCNC: 2.3 G/DL (ref 3.5–5)
ALBUMIN/GLOB SERPL: 0.5 RATIO (ref 1.1–2)
ALP SERPL-CCNC: 55 UNIT/L (ref 40–150)
ALT SERPL-CCNC: 14 UNIT/L (ref 0–55)
AST SERPL-CCNC: 20 UNIT/L (ref 5–34)
BASOPHILS # BLD AUTO: 0 X10(3)/MCL
BASOPHILS NFR BLD AUTO: 0 %
BILIRUB SERPL-MCNC: 0.1 MG/DL
BUN SERPL-MCNC: 23.9 MG/DL (ref 8.4–25.7)
CALCIUM SERPL-MCNC: 8.9 MG/DL (ref 8.4–10.2)
CD3+CD4+ CELLS # SPEC: 27 UNIT/L (ref 589–1505)
CD3+CD4+ CELLS NFR BLD: 3.6 %
CHLORIDE SERPL-SCNC: 107 MMOL/L (ref 98–107)
CO2 SERPL-SCNC: 20 MMOL/L (ref 22–29)
CREAT SERPL-MCNC: 1.07 MG/DL (ref 0.73–1.18)
EOSINOPHIL # BLD AUTO: 0 X10(3)/MCL (ref 0–0.9)
EOSINOPHIL NFR BLD AUTO: 0 %
ERYTHROCYTE [DISTWIDTH] IN BLOOD BY AUTOMATED COUNT: 13.1 % (ref 11.5–17)
GFR SERPLBLD CREATININE-BSD FMLA CKD-EPI: >60 MLS/MIN/1.73/M2
GLOBULIN SER-MCNC: 5.1 GM/DL (ref 2.4–3.5)
GLUCOSE SERPL-MCNC: 131 MG/DL (ref 74–100)
HCT VFR BLD AUTO: 33.4 % (ref 42–52)
HGB BLD-MCNC: 11 G/DL (ref 14–18)
IMM GRANULOCYTES # BLD AUTO: 0.02 X10(3)/MCL (ref 0–0.04)
IMM GRANULOCYTES NFR BLD AUTO: 0.3 %
LYMPHOCYTES # BLD AUTO: 0.75 X10(3)/MCL (ref 0.6–4.6)
LYMPHOCYTES # BLD AUTO: 748.8 X10(3)/MCL (ref 1260–5520)
LYMPHOCYTES NFR BLD AUTO: 9.7 %
LYMPHOCYTES NFR LN MANUAL: 9 % (ref 28–48)
LYMPHOMA - T-CELL MARKERS SPEC-IMP: ABNORMAL
MCH RBC QN AUTO: 31.3 PG (ref 27–31)
MCHC RBC AUTO-ENTMCNC: 32.9 G/DL (ref 33–36)
MCV RBC AUTO: 94.9 FL (ref 80–94)
MONOCYTES # BLD AUTO: 0.43 X10(3)/MCL (ref 0.1–1.3)
MONOCYTES NFR BLD AUTO: 5.6 %
NEUTROPHILS # BLD AUTO: 6.53 X10(3)/MCL (ref 2.1–9.2)
NEUTROPHILS NFR BLD AUTO: 84.4 %
NRBC BLD AUTO-RTO: 0 %
PLATELET # BLD AUTO: 196 X10(3)/MCL (ref 130–400)
PMV BLD AUTO: 11.2 FL (ref 7.4–10.4)
POTASSIUM SERPL-SCNC: 4.7 MMOL/L (ref 3.5–5.1)
PROT SERPL-MCNC: 7.4 GM/DL (ref 6.4–8.3)
RBC # BLD AUTO: 3.52 X10(6)/MCL (ref 4.7–6.1)
RPR SER QL: NORMAL
RPR SER-TITR: NORMAL {TITER}
SODIUM SERPL-SCNC: 138 MMOL/L (ref 136–145)
WBC # BLD AUTO: 8320 /MM3 (ref 4500–11500)
WBC # SPEC AUTO: 7.73 X10(3)/MCL (ref 4.5–11.5)

## 2024-04-15 PROCEDURE — 94761 N-INVAS EAR/PLS OXIMETRY MLT: CPT

## 2024-04-15 PROCEDURE — 85025 COMPLETE CBC W/AUTO DIFF WBC: CPT | Performed by: STUDENT IN AN ORGANIZED HEALTH CARE EDUCATION/TRAINING PROGRAM

## 2024-04-15 PROCEDURE — 63600175 PHARM REV CODE 636 W HCPCS: Performed by: STUDENT IN AN ORGANIZED HEALTH CARE EDUCATION/TRAINING PROGRAM

## 2024-04-15 PROCEDURE — 80053 COMPREHEN METABOLIC PANEL: CPT | Performed by: STUDENT IN AN ORGANIZED HEALTH CARE EDUCATION/TRAINING PROGRAM

## 2024-04-15 RX ADMIN — SODIUM CHLORIDE, POTASSIUM CHLORIDE, SODIUM LACTATE AND CALCIUM CHLORIDE: 600; 310; 30; 20 INJECTION, SOLUTION INTRAVENOUS at 06:04

## 2024-04-15 NOTE — DISCHARGE SUMMARY
AMA Note     Date of AMA: 04/15/2024    Despite our efforts, Mr. Gucci Wooten has decided to leave AGAINST MEDICAL ADVICE. He has normal mental status and full decisional capacity. The patient understands his medical comorbidities including his pending further evaluation. Extensive discussion regarding risks of leaving AMA were had, including but not limited to permanent disability, death, ECT., Patient had the opportunity to ask questions about his medical condition and all questions were answered.  Again, explained the importance of remaining for further investigation, but he continues to refuse.  The patient has been informed to return for care if at any time they experience worsening symptoms and has been referred to his local medical physician for follow-up ASAP.    Imer Jamison,   Internal Medicine - PGY-2    04/15/2024

## 2024-04-15 NOTE — NURSING
Pt left AMA. IV removed via staff and tele taken off by pt. Pt walked off of unit even after MD spoke to pt.

## 2024-04-16 LAB — HIV1 RNA # PLAS NAA DL=20: ABNORMAL COPIES/ML

## 2024-04-17 LAB
P JIROVECII DNA L RESP QL NAA+NON-PROBE: NEGATIVE
SPECIMEN SOURCE: NORMAL
T PALLIDUM AB SER QL AGGL: POSITIVE

## 2024-04-19 LAB
BACTERIA BLD CULT: NORMAL
BACTERIA BLD CULT: NORMAL

## 2024-07-05 ENCOUNTER — HOSPITAL ENCOUNTER (EMERGENCY)
Facility: HOSPITAL | Age: 54
Discharge: HOME | End: 2024-07-05
Attending: FAMILY MEDICINE
Payer: MEDICAID

## 2024-07-05 VITALS
OXYGEN SATURATION: 95 % | RESPIRATION RATE: 16 BRPM | SYSTOLIC BLOOD PRESSURE: 127 MMHG | WEIGHT: 135 LBS | HEIGHT: 72 IN | HEART RATE: 61 BPM | DIASTOLIC BLOOD PRESSURE: 88 MMHG | TEMPERATURE: 98 F | BODY MASS INDEX: 18.28 KG/M2

## 2024-07-05 DIAGNOSIS — B20 HIV INFECTION, UNSPECIFIED SYMPTOM STATUS: ICD-10-CM

## 2024-07-05 DIAGNOSIS — R06.00 DYSPNEA: ICD-10-CM

## 2024-07-05 DIAGNOSIS — J44.1 COPD EXACERBATION: Primary | ICD-10-CM

## 2024-07-05 LAB
ALBUMIN SERPL-MCNC: 2.6 G/DL (ref 3.5–5)
ALBUMIN/GLOB SERPL: 0.5 RATIO (ref 1.1–2)
ALP SERPL-CCNC: 50 UNIT/L (ref 40–150)
ALT SERPL-CCNC: 15 UNIT/L (ref 0–55)
ANION GAP SERPL CALC-SCNC: 6 MEQ/L
AST SERPL-CCNC: 21 UNIT/L (ref 5–34)
BASOPHILS # BLD AUTO: 0.01 X10(3)/MCL
BASOPHILS NFR BLD AUTO: 0.3 %
BILIRUB SERPL-MCNC: 0.3 MG/DL
BUN SERPL-MCNC: 13.9 MG/DL (ref 8.4–25.7)
CALCIUM SERPL-MCNC: 8.5 MG/DL (ref 8.4–10.2)
CHLORIDE SERPL-SCNC: 108 MMOL/L (ref 98–107)
CO2 SERPL-SCNC: 25 MMOL/L (ref 22–29)
CREAT SERPL-MCNC: 1.07 MG/DL (ref 0.73–1.18)
CREAT/UREA NIT SERPL: 13
EOSINOPHIL # BLD AUTO: 0.32 X10(3)/MCL (ref 0–0.9)
EOSINOPHIL NFR BLD AUTO: 9.3 %
ERYTHROCYTE [DISTWIDTH] IN BLOOD BY AUTOMATED COUNT: 12.7 % (ref 11.5–17)
GFR SERPLBLD CREATININE-BSD FMLA CKD-EPI: >60 ML/MIN/1.73/M2
GLOBULIN SER-MCNC: 4.8 GM/DL (ref 2.4–3.5)
GLUCOSE SERPL-MCNC: 92 MG/DL (ref 74–100)
HCT VFR BLD AUTO: 32.3 % (ref 42–52)
HGB BLD-MCNC: 11 G/DL (ref 14–18)
IMM GRANULOCYTES # BLD AUTO: 0 X10(3)/MCL (ref 0–0.04)
IMM GRANULOCYTES NFR BLD AUTO: 0 %
LDH SERPL-CCNC: 221 U/L (ref 125–220)
LYMPHOCYTES # BLD AUTO: 0.62 X10(3)/MCL (ref 0.6–4.6)
LYMPHOCYTES NFR BLD AUTO: 18 %
MAGNESIUM SERPL-MCNC: 1.8 MG/DL (ref 1.6–2.6)
MCH RBC QN AUTO: 32.8 PG (ref 27–31)
MCHC RBC AUTO-ENTMCNC: 34.1 G/DL (ref 33–36)
MCV RBC AUTO: 96.4 FL (ref 80–94)
MONOCYTES # BLD AUTO: 0.29 X10(3)/MCL (ref 0.1–1.3)
MONOCYTES NFR BLD AUTO: 8.4 %
NEUTROPHILS # BLD AUTO: 2.2 X10(3)/MCL (ref 2.1–9.2)
NEUTROPHILS NFR BLD AUTO: 64 %
NRBC BLD AUTO-RTO: 0 %
OHS QRS DURATION: 98 MS
OHS QTC CALCULATION: 400 MS
PLATELET # BLD AUTO: 223 X10(3)/MCL (ref 130–400)
PMV BLD AUTO: 9.4 FL (ref 7.4–10.4)
POTASSIUM SERPL-SCNC: 3.3 MMOL/L (ref 3.5–5.1)
PROT SERPL-MCNC: 7.4 GM/DL (ref 6.4–8.3)
RBC # BLD AUTO: 3.35 X10(6)/MCL (ref 4.7–6.1)
SODIUM SERPL-SCNC: 139 MMOL/L (ref 136–145)
TROPONIN I SERPL-MCNC: <0.01 NG/ML (ref 0–0.04)
WBC # BLD AUTO: 3.44 X10(3)/MCL (ref 4.5–11.5)

## 2024-07-05 PROCEDURE — 80053 COMPREHEN METABOLIC PANEL: CPT | Performed by: FAMILY MEDICINE

## 2024-07-05 PROCEDURE — 99285 EMERGENCY DEPT VISIT HI MDM: CPT | Mod: 25

## 2024-07-05 PROCEDURE — 25000242 PHARM REV CODE 250 ALT 637 W/ HCPCS: Performed by: FAMILY MEDICINE

## 2024-07-05 PROCEDURE — 94761 N-INVAS EAR/PLS OXIMETRY MLT: CPT

## 2024-07-05 PROCEDURE — 96374 THER/PROPH/DIAG INJ IV PUSH: CPT

## 2024-07-05 PROCEDURE — 84484 ASSAY OF TROPONIN QUANT: CPT | Performed by: FAMILY MEDICINE

## 2024-07-05 PROCEDURE — 63600175 PHARM REV CODE 636 W HCPCS: Performed by: FAMILY MEDICINE

## 2024-07-05 PROCEDURE — 83615 LACTATE (LD) (LDH) ENZYME: CPT | Performed by: FAMILY MEDICINE

## 2024-07-05 PROCEDURE — 93005 ELECTROCARDIOGRAM TRACING: CPT

## 2024-07-05 PROCEDURE — 83735 ASSAY OF MAGNESIUM: CPT | Performed by: FAMILY MEDICINE

## 2024-07-05 PROCEDURE — 85025 COMPLETE CBC W/AUTO DIFF WBC: CPT | Performed by: FAMILY MEDICINE

## 2024-07-05 RX ORDER — ALBUTEROL SULFATE 90 UG/1
1-2 AEROSOL, METERED RESPIRATORY (INHALATION) EVERY 6 HOURS PRN
Qty: 8 G | Refills: 0 | Status: SHIPPED | OUTPATIENT
Start: 2024-07-05 | End: 2025-07-05

## 2024-07-05 RX ORDER — IPRATROPIUM BROMIDE AND ALBUTEROL SULFATE 2.5; .5 MG/3ML; MG/3ML
3 SOLUTION RESPIRATORY (INHALATION)
Status: COMPLETED | OUTPATIENT
Start: 2024-07-05 | End: 2024-07-05

## 2024-07-05 RX ORDER — METHYLPREDNISOLONE SOD SUCC 125 MG
125 VIAL (EA) INJECTION
Status: COMPLETED | OUTPATIENT
Start: 2024-07-05 | End: 2024-07-05

## 2024-07-05 RX ORDER — METHYLPREDNISOLONE 4 MG/1
TABLET ORAL
Qty: 1 EACH | Refills: 0 | Status: SHIPPED | OUTPATIENT
Start: 2024-07-05

## 2024-07-05 RX ADMIN — METHYLPREDNISOLONE SODIUM SUCCINATE 125 MG: 125 INJECTION, POWDER, FOR SOLUTION INTRAMUSCULAR; INTRAVENOUS at 04:07

## 2024-07-05 RX ADMIN — IPRATROPIUM BROMIDE AND ALBUTEROL SULFATE 3 ML: .5; 3 SOLUTION RESPIRATORY (INHALATION) at 04:07

## 2024-07-05 NOTE — ED PROVIDER NOTES
Encounter Date: 7/5/2024       History     Chief Complaint   Patient presents with    Shortness of Breath     SOB after smoking crack last PM. Denies pain     53-year-old gentleman presents emergency room complaints of shortness of breath after smoking crack cocaine last night.  Denies chest pain.  Denies abdominal pain nausea or vomiting.    The history is provided by the patient.     Review of patient's allergies indicates:   Allergen Reactions    Acetaminophen      Other reaction(s): vomiting    Iodine      Other reaction(s): stomach cramps    Shellfish containing products      Other reaction(s): stomach cramps     Past Medical History:   Diagnosis Date    Immune deficiency disorder      History reviewed. No pertinent surgical history.  No family history on file.  Social History     Tobacco Use    Smoking status: Every Day     Types: Cigarettes    Smokeless tobacco: Never     Review of Systems   Constitutional:  Negative for chills, fatigue and fever.   HENT:  Negative for ear pain, rhinorrhea and sore throat.    Eyes:  Negative for photophobia and pain.   Respiratory:  Positive for shortness of breath and wheezing. Negative for cough.    Cardiovascular:  Negative for chest pain.   Gastrointestinal:  Negative for abdominal pain, diarrhea, nausea and vomiting.   Genitourinary:  Negative for dysuria.   Neurological:  Negative for dizziness, weakness and headaches.   All other systems reviewed and are negative.      Physical Exam     Initial Vitals [07/05/24 0411]   BP Pulse Resp Temp SpO2   (!) 130/91 84 (!) 28 98.1 °F (36.7 °C) 98 %      MAP       --         Physical Exam    Nursing note and vitals reviewed.  Constitutional: He appears well-developed and well-nourished.   HENT:   Head: Normocephalic and atraumatic.   Mouth/Throat: Oropharynx is clear and moist.   Eyes: EOM are normal. Pupils are equal, round, and reactive to light.   Neck: Neck supple.   Normal range of motion.  Cardiovascular:  Normal rate,  regular rhythm and normal heart sounds.     Exam reveals no gallop and no friction rub.       No murmur heard.  Pulmonary/Chest: No respiratory distress. He has wheezes. He has rhonchi. He has no rales.   Abdominal: Abdomen is soft. Bowel sounds are normal. He exhibits no distension. There is no abdominal tenderness.   Musculoskeletal:         General: Normal range of motion.      Cervical back: Normal range of motion and neck supple.     Neurological: He is alert and oriented to person, place, and time. He has normal strength.   Skin: Skin is warm and dry.   Psychiatric: He has a normal mood and affect. His behavior is normal. Judgment and thought content normal.         ED Course   Procedures  Labs Reviewed   COMPREHENSIVE METABOLIC PANEL - Abnormal; Notable for the following components:       Result Value    Potassium 3.3 (*)     Chloride 108 (*)     Albumin 2.6 (*)     Globulin 4.8 (*)     Albumin/Globulin Ratio 0.5 (*)     All other components within normal limits   CBC WITH DIFFERENTIAL - Abnormal; Notable for the following components:    WBC 3.44 (*)     RBC 3.35 (*)     Hgb 11.0 (*)     Hct 32.3 (*)     MCV 96.4 (*)     MCH 32.8 (*)     All other components within normal limits   TROPONIN I - Normal   MAGNESIUM - Normal   CBC W/ AUTO DIFFERENTIAL    Narrative:     The following orders were created for panel order CBC Auto Differential.  Procedure                               Abnormality         Status                     ---------                               -----------         ------                     CBC with Differential[[id number]]       Abnormal            Final result                 Please view results for these tests on the individual orders.   EXTRA TUBES    Narrative:     The following orders were created for panel order EXTRA TUBES.  Procedure                               Abnormality         Status                     ---------                               -----------         ------                      Light Blue Top Hold[[id number]]                                                        Gold Top Hold[[id number]]                                                                Please view results for these tests on the individual orders.   LIGHT BLUE TOP HOLD   GOLD TOP HOLD   LACTATE DEHYDROGENASE     EKG Readings: (Independently Interpreted)   My Independent EKG Interpretation  07/05/2024 4:18 AM  Rate: 51 bpm  Rhythm: Sinus  Axis: Rightward  Intervals: Normal  ST Changes: Nonspecific  Impression: Sinus bradycardia with nonspecific ST changes           Imaging Results              X-Ray Chest 1 View (In process)                      Medications   albuterol-ipratropium 2.5 mg-0.5 mg/3 mL nebulizer solution 3 mL (3 mLs Nebulization Given 7/5/24 043)   methylPREDNISolone sodium succinate injection 125 mg (125 mg Intravenous Given 7/5/24 8293)     Medical Decision Making  53-year-old gentleman presents emergency room complaints of shortness of breath after smoking crack cocaine last night, slightly lethargic, but answers questions appropriately talking in full sentences.  Expiratory wheezing and rhonchi noted on physical examination.  Will obtain laboratory evaluation including CBC CMP and EKG.  Will obtain chest x-ray, and provide symptomatic treatment with nebulizer treatments and Solu-Medrol.  Will continue to monitor.    Differential diagnosis: COPD, community-acquired pneumonia, bronchitis, chemical pneumonitis    Amount and/or Complexity of Data Reviewed  External Data Reviewed: notes.     Details: Reviewed ED visit from 04/14/2024 at which time recommended hospital admission.  Patient was admitted for evaluation due to untreated HIV, in the setting of increased shortness of breath.  Patient left the following day against medical advice, and has not followed up with the primary care physician.    04/14/2024 1633:  CD4 absolute 27   04/14/2024 1825:  HIV RNA 77,700 copies per mL    Labs: ordered.  Decision-making details documented in ED Course.  Radiology: ordered.    Risk  Prescription drug management.               ED Course as of 07/05/24 0631   Fri Jul 05, 2024   0455 WBC(!): 3.44 [MW]   0455 Hemoglobin(!): 11.0 [MW]   0455 Hematocrit(!): 32.3 [MW]   0455 MPV: 9.4 [MW]   0455 Platelet Count: 223 [MW]   0455 Neut %: 64.0 [MW]   0526 Troponin I: <0.010 [MW]   0526 Magnesium : 1.80 [MW]   0526 Sodium: 139 [MW]   0526 Potassium(!): 3.3 [MW]   0526 Chloride(!): 108 [MW]   0526 CO2: 25 [MW]   0526 BUN: 13.9 [MW]   0526 Creatinine: 1.07 [MW]   0526 Albumin(!): 2.6 [MW]   0526 Globulin, Total(!): 4.8 [MW]   0526 Albumin/Globulin Ratio(!): 0.5 [MW]   0526 BILIRUBIN TOTAL: 0.3 [MW]   0526 ALP: 50 [MW]   0526 ALT: 15 [MW]   0526 AST: 21 [MW]   0630 Feeling improved.  Discussed results with the patient.  Patient desires to leave-will refer to infectious disease clinic for outpatient follow-up.  ER precautions given for any acute worsening. [MW]      ED Course User Index  [MW] Shaheen Moody MD                           Clinical Impression:  Final diagnoses:  [R06.00] Dyspnea  [J44.1] COPD exacerbation (Primary)  [B20] HIV infection, unspecified symptom status          ED Disposition Condition    Discharge Stable          ED Prescriptions       Medication Sig Dispense Start Date End Date Auth. Provider    albuterol (PROVENTIL/VENTOLIN HFA) 90 mcg/actuation inhaler Inhale 1-2 puffs into the lungs every 6 (six) hours as needed for Wheezing. Rescue 8 g 7/5/2024 7/5/2025 Shaheen Moody MD    methylPREDNISolone (MEDROL DOSEPACK) 4 mg tablet Take as directed on rick 1 each 7/5/2024 -- Shaheen Moody MD          Follow-up Information       Follow up With Specialties Details Why Contact Info    [facility] Emergency Medicine  As needed, If symptoms worsen [address] [facility] [place] [id number] [phone]    Angelique Kay, FNP Infectious Diseases   [address]  [address] [facility] [address]  [phone]      [facility] Infectious Diseases   [address] [facility] [address] [phone]             Shaheen Moody MD  07/05/24 0631

## 2024-07-15 PROBLEM — A41.9 SEPSIS: Status: RESOLVED | Noted: 2024-04-14 | Resolved: 2024-07-15

## 2025-01-26 ENCOUNTER — HOSPITAL ENCOUNTER (EMERGENCY)
Facility: HOSPITAL | Age: 55
Discharge: HOME | End: 2025-01-26
Attending: INTERNAL MEDICINE
Payer: MEDICAID

## 2025-01-26 VITALS
HEIGHT: 71 IN | TEMPERATURE: 100 F | SYSTOLIC BLOOD PRESSURE: 122 MMHG | DIASTOLIC BLOOD PRESSURE: 79 MMHG | WEIGHT: 134.94 LBS | HEART RATE: 109 BPM | RESPIRATION RATE: 20 BRPM | BODY MASS INDEX: 18.89 KG/M2 | OXYGEN SATURATION: 98 %

## 2025-01-26 DIAGNOSIS — R05.9 COUGH: ICD-10-CM

## 2025-01-26 DIAGNOSIS — J06.9 UPPER RESPIRATORY TRACT INFECTION, UNSPECIFIED TYPE: Primary | ICD-10-CM

## 2025-01-26 LAB
FLUAV AG UPPER RESP QL IA.RAPID: NOT DETECTED
FLUBV AG UPPER RESP QL IA.RAPID: NOT DETECTED
RSV A 5' UTR RNA NPH QL NAA+PROBE: NOT DETECTED
SARS-COV-2 RNA RESP QL NAA+PROBE: NOT DETECTED
STREP A PCR (OHS): NOT DETECTED

## 2025-01-26 PROCEDURE — 87651 STREP A DNA AMP PROBE: CPT | Performed by: NURSE PRACTITIONER

## 2025-01-26 PROCEDURE — 0241U COVID/RSV/FLU A&B PCR: CPT | Performed by: NURSE PRACTITIONER

## 2025-01-26 PROCEDURE — 99283 EMERGENCY DEPT VISIT LOW MDM: CPT | Mod: 25

## 2025-01-26 RX ORDER — PROMETHAZINE HYDROCHLORIDE AND DEXTROMETHORPHAN HYDROBROMIDE 6.25; 15 MG/5ML; MG/5ML
5 SYRUP ORAL EVERY 4 HOURS PRN
Qty: 120 ML | Refills: 0 | Status: SHIPPED | OUTPATIENT
Start: 2025-01-26 | End: 2025-02-05

## 2025-01-26 NOTE — ED PROVIDER NOTES
Encounter Date: 1/26/2025       History     Chief Complaint   Patient presents with    Fever     Pt reports body aches, headache, chills, lightheadedness x2 days. Intermittently cooperative with triage questioning.      The patient presents with occas nonprod cough, sore throat, nasal congestion, increased fatigue, subjective fever, no cp or sob, no known covid-19 exposure.  The onset was 2 days ago.  The course/duration of symptoms is constant.  The degree at present is minimal.  The exacerbating factor is none.  The relieving factor is none.  Risk factors consist of HIV, homelessness, smoker.  Prior episodes: occasional.  Associated symptoms: denies chest pain and denies shortness of breath.  Additional history: none.            Review of patient's allergies indicates:   Allergen Reactions    Acetaminophen      Other reaction(s): vomiting    Iodine      Other reaction(s): stomach cramps    Shellfish containing products      Other reaction(s): stomach cramps     Past Medical History:   Diagnosis Date    Immune deficiency disorder      History reviewed. No pertinent surgical history.  No family history on file.  Social History     Tobacco Use    Smoking status: Every Day     Types: Cigarettes    Smokeless tobacco: Never     Review of Systems   Constitutional:  Positive for fatigue and fever.   HENT:  Positive for congestion and sore throat.    Respiratory:  Positive for cough. Negative for shortness of breath.    Cardiovascular:  Negative for chest pain.   Gastrointestinal:  Negative for nausea.   Genitourinary:  Negative for dysuria.   Musculoskeletal:  Negative for back pain.   Skin:  Negative for rash.   Neurological:  Negative for weakness.   Hematological:  Does not bruise/bleed easily.   All other systems reviewed and are negative.      Physical Exam     Initial Vitals [01/26/25 1735]   BP Pulse Resp Temp SpO2   122/79 88 20 100 °F (37.8 °C) 98 %      MAP       --         Physical Exam    Nursing note and  vitals reviewed.  Constitutional: He appears well-developed and well-nourished.   HENT:   Head: Normocephalic and atraumatic.   Right Ear: Tympanic membrane normal.   Left Ear: Tympanic membrane normal.   Nose: Nose normal. Mouth/Throat: Uvula is midline, oropharynx is clear and moist and mucous membranes are normal.   Neck: Neck supple.   Normal range of motion.  Cardiovascular:  Normal rate, regular rhythm, normal heart sounds and intact distal pulses.           Pulmonary/Chest: Effort normal and breath sounds normal. He has no decreased breath sounds.   Abdominal: Abdomen is soft and flat. Bowel sounds are normal. There is no abdominal tenderness.   Musculoskeletal:         General: Normal range of motion.      Cervical back: Normal range of motion and neck supple.     Neurological: He is alert and oriented to person, place, and time. He has normal strength.   Skin: Skin is warm and dry.   Psychiatric: He has a normal mood and affect.         ED Course   Procedures  Labs Reviewed   COVID/RSV/FLU A&B PCR - Normal       Result Value    Influenza A PCR Not Detected      Influenza B PCR Not Detected      Respiratory Syncytial Virus PCR Not Detected      SARS-CoV-2 PCR Not Detected      Narrative:     The Xpert Xpress SARS-CoV-2/FLU/RSV plus is a rapid, multiplexed real-time PCR test intended for the simultaneous qualitative detection and differentiation of SARS-CoV-2, Influenza A, Influenza B, and respiratory syncytial virus (RSV) viral RNA in either nasopharyngeal swab or nasal swab specimens.         STREP GROUP A BY PCR - Normal    STREP A PCR ([facility]) Not Detected      Narrative:     The Xpert Xpress Strep A test is a rapid, qualitative in vitro diagnostic test for the detection of Streptococcus pyogenes (Group A ß-hemolytic Streptococcus, Strep A) in throat swab specimens from patients with signs and symptoms of pharyngitis.            Imaging Results              X-Ray Chest AP Portable (Final result)  Result time  01/26/25 18:19:26      Final result by Tam Grove MD (01/26/25 18:19:26)                   Impression:      Questionable density on the left most likely represents the nipple.      Electronically signed by: aTm Grove MD  Date:    01/26/2025  Time:    18:19               Narrative:    EXAMINATION:  XR CHEST AP PORTABLE    CLINICAL HISTORY:  Cough, unspecified    TECHNIQUE:  Single frontal view of the chest was performed.    COMPARISON:  07/05/2024    FINDINGS:  No acute infiltrates are seen.  There is a density on the left which most likely represents the nipple.  It measures 9 mm.  Heart is mildly elongated.                                       Medications - No data to display  Medical Decision Making  The patient presents with occas nonprod cough, sore throat, nasal congestion, increased fatigue, subjective fever, no cp or sob, no known covid-19 exposure.  The onset was 2 days ago.  The course/duration of symptoms is constant.  The degree at present is minimal.  The exacerbating factor is none.  The relieving factor is none.  Risk factors consist of HIV, homelessness, smoker.  Prior episodes: occasional.  Associated symptoms: denies chest pain and denies shortness of breath.  Additional history: none.        He is nontoxic appearing, taking po fluids without difficulty, will follow up with pcp, strict return to ER precautions given. 7:06 PM DISPOSITION: The patient is resting comfortably in no acute distress.  He is hemodynamically stable and is without objective evidence for acute process requiring urgent intervention or hospitalization. I provided counseling to patient with regard to condition, the treatment plan, specific conditions for return, and the importance of follow up. Detailed written and verbal instructions provided to patient and he expressed a verbal understanding of the discharge instructions and overall management plan. Reiterated the importance of medication administration and safety  and advised patient to follow up with primary care provider in 3-5 days or sooner if needed.  Answered questions at this time. The patient is stable for discharge.       Amount and/or Complexity of Data Reviewed  Radiology: ordered. Decision-making details documented in ED Course.               ED Course as of 01/26/25 1907   Sun Jan 26, 2025   1836 X-Ray Chest AP Portable  Impression:     Questionable density on the left most likely represents the nipple.   [RB]   1905 Given strict ED return precautions. I have spoken with the patient and/or caregivers. I have explained the patient's condition, diagnoses and treatment plan based on the information available to me at this time. I have answered the patient's and/or caregiver's questions and addressed any concerns. The patient and/or caregivers have as good an understanding of the patient's diagnosis, condition and treatment plan as can be expected at this point. The vital signs have been stable. The patient's condition is stable and appropriate for discharge from the emergency department.      The patient will pursue further outpatient evaluation with the primary care physician or other designated or consulting physician as outlined in the discharge instructions. The patient and/or caregivers are agreeable to this plan of care and follow-up instructions have been explained in detail. The patient and/or caregivers have received these instructions in written format and have expressed an understanding of the discharge instructions. The patient and/or caregivers are aware that any significant change in condition or worsening of symptoms should prompt an immediate return to this or the closest emergency department or a call to 911.      [RB]      ED Course User Index  [RB] [facility], [facility]                           Clinical Impression:  Final diagnoses:  [R05.9] Cough  [J06.9] Upper respiratory tract infection, unspecified type (Primary)          ED Disposition  Condition    Discharge Stable          ED Prescriptions       Medication Sig Dispense Start Date End Date Auth. Provider    promethazine-dextromethorphan (PROMETHAZINE-DM) 6.25-15 mg/5 mL Syrp Take 5 mLs by mouth every 4 (four) hours as needed (cough). 120 mL 1/26/2025 2/5/2025 Samir Burger ACNP          Follow-up Information       Follow up With Specialties Details Why Contact Info    Angelique Kay FNP Infectious Diseases In 3 days  [address] [facility] [address]  [phone]      [facility] Emergency Medicine  If symptoms worsen [address] [facility] [id number] [phone]             Samir Burger ACNP  01/26/25 9139

## 2025-06-14 ENCOUNTER — HOSPITAL ENCOUNTER (EMERGENCY)
Facility: HOSPITAL | Age: 55
Discharge: HOME | End: 2025-06-14
Attending: INTERNAL MEDICINE

## 2025-06-14 VITALS
OXYGEN SATURATION: 98 % | WEIGHT: 129.63 LBS | BODY MASS INDEX: 18.15 KG/M2 | HEIGHT: 71 IN | SYSTOLIC BLOOD PRESSURE: 117 MMHG | TEMPERATURE: 97 F | DIASTOLIC BLOOD PRESSURE: 73 MMHG | RESPIRATION RATE: 18 BRPM | HEART RATE: 95 BPM

## 2025-06-14 DIAGNOSIS — F14.10 COCAINE ABUSE: ICD-10-CM

## 2025-06-14 DIAGNOSIS — F54 PSYCHOGENIC FORMICATION: ICD-10-CM

## 2025-06-14 DIAGNOSIS — R20.2 PSYCHOGENIC FORMICATION: ICD-10-CM

## 2025-06-14 DIAGNOSIS — Z21 HISTORY OF HIV INFECTION: Primary | ICD-10-CM

## 2025-06-14 LAB
ACCEPTIBLE SP GR UR QL: 1.02 (ref 1–1.03)
ALBUMIN SERPL-MCNC: 1.6 G/DL (ref 3.5–5)
ALBUMIN/GLOB SERPL: 0.2 RATIO (ref 1.1–2)
ALP SERPL-CCNC: 44 UNIT/L (ref 40–150)
ALT SERPL-CCNC: 11 UNIT/L (ref 0–55)
AMPHET UR QL SCN: NEGATIVE
ANION GAP SERPL CALC-SCNC: 6 MEQ/L
AST SERPL-CCNC: 25 UNIT/L (ref 11–45)
BARBITURATE SCN PRESENT UR: NEGATIVE
BASOPHILS # BLD AUTO: 0.02 X10(3)/MCL
BASOPHILS NFR BLD AUTO: 0.2 %
BENZODIAZ UR QL SCN: NEGATIVE
BILIRUB SERPL-MCNC: 0.2 MG/DL
BUN SERPL-MCNC: 15 MG/DL (ref 8.4–25.7)
CALCIUM SERPL-MCNC: 8.2 MG/DL (ref 8.4–10.2)
CANNABINOIDS UR QL SCN: NEGATIVE
CHLORIDE SERPL-SCNC: 105 MMOL/L (ref 98–107)
CO2 SERPL-SCNC: 25 MMOL/L (ref 22–29)
COCAINE UR QL SCN: POSITIVE
CREAT SERPL-MCNC: 1.14 MG/DL (ref 0.72–1.25)
CREAT/UREA NIT SERPL: 13
EOSINOPHIL # BLD AUTO: 0.38 X10(3)/MCL (ref 0–0.9)
EOSINOPHIL NFR BLD AUTO: 4.6 %
ERYTHROCYTE [DISTWIDTH] IN BLOOD BY AUTOMATED COUNT: 13.6 % (ref 11.5–17)
ETHANOL SERPL-MCNC: <10 MG/DL
FENTANYL UR QL SCN: NEGATIVE
GFR SERPLBLD CREATININE-BSD FMLA CKD-EPI: >60 ML/MIN/1.73/M2
GLOBULIN SER-MCNC: 6.7 GM/DL (ref 2.4–3.5)
GLUCOSE SERPL-MCNC: 71 MG/DL (ref 74–100)
HCT VFR BLD AUTO: 28.8 % (ref 42–52)
HGB BLD-MCNC: 9.6 G/DL (ref 14–18)
HOLD SPECIMEN: NORMAL
IMM GRANULOCYTES # BLD AUTO: 0.04 X10(3)/MCL (ref 0–0.04)
IMM GRANULOCYTES NFR BLD AUTO: 0.5 %
LYMPHOCYTES # BLD AUTO: 0.73 X10(3)/MCL (ref 0.6–4.6)
LYMPHOCYTES NFR BLD AUTO: 8.9 %
MCH RBC QN AUTO: 32.1 PG (ref 27–31)
MCHC RBC AUTO-ENTMCNC: 33.3 G/DL (ref 33–36)
MCV RBC AUTO: 96.3 FL (ref 80–94)
MDMA UR QL SCN: NEGATIVE
MONOCYTES # BLD AUTO: 0.57 X10(3)/MCL (ref 0.1–1.3)
MONOCYTES NFR BLD AUTO: 6.9 %
NEUTROPHILS # BLD AUTO: 6.48 X10(3)/MCL (ref 2.1–9.2)
NEUTROPHILS NFR BLD AUTO: 78.9 %
NRBC BLD AUTO-RTO: 0 %
OPIATES UR QL SCN: NEGATIVE
PCP UR QL: NEGATIVE
PH UR: 6.5 [PH] (ref 3–11)
PLATELET # BLD AUTO: 268 X10(3)/MCL (ref 130–400)
PMV BLD AUTO: 9.9 FL (ref 7.4–10.4)
POTASSIUM SERPL-SCNC: 3.5 MMOL/L (ref 3.5–5.1)
PROT SERPL-MCNC: 8.3 GM/DL (ref 6.4–8.3)
RBC # BLD AUTO: 2.99 X10(6)/MCL (ref 4.7–6.1)
SODIUM SERPL-SCNC: 136 MMOL/L (ref 136–145)
WBC # BLD AUTO: 8.22 X10(3)/MCL (ref 4.5–11.5)

## 2025-06-14 PROCEDURE — 80053 COMPREHEN METABOLIC PANEL: CPT | Performed by: INTERNAL MEDICINE

## 2025-06-14 PROCEDURE — 80307 DRUG TEST PRSMV CHEM ANLYZR: CPT | Performed by: INTERNAL MEDICINE

## 2025-06-14 PROCEDURE — 99283 EMERGENCY DEPT VISIT LOW MDM: CPT

## 2025-06-14 PROCEDURE — 82077 ASSAY SPEC XCP UR&BREATH IA: CPT | Performed by: INTERNAL MEDICINE

## 2025-06-14 PROCEDURE — 25000003 PHARM REV CODE 250: Performed by: INTERNAL MEDICINE

## 2025-06-14 PROCEDURE — 85025 COMPLETE CBC W/AUTO DIFF WBC: CPT | Performed by: INTERNAL MEDICINE

## 2025-06-14 RX ORDER — QUETIAPINE FUMARATE 25 MG/1
50 TABLET, FILM COATED ORAL ONCE
Status: COMPLETED | OUTPATIENT
Start: 2025-06-14 | End: 2025-06-14

## 2025-06-14 RX ADMIN — QUETIAPINE FUMARATE 50 MG: 25 TABLET ORAL at 06:06

## 2025-06-14 NOTE — ED PROVIDER NOTES
"Encounter Date: 6/14/2025       History     Chief Complaint   Patient presents with    Itching     Pt states feels like bugs have been crawling on him for the past 2 months with showers "expanding" the sensation. Has not visualized any bugs in bedding or on self. Admits to crack cocaine use 2 days ago. Denies SI/HI. AH/VH     Presents concern due to the sensation of insects crawling on him. States no prior episodes, no infectious contacts. Admits relapsed in crack cocaine abuse and will like rehabilitation. Hx of HIV, states out of his medications. States relapsed in cocaine after the death of his wife. Denies SI or HI.    The history is provided by the patient.     Review of patient's allergies indicates:   Allergen Reactions    Acetaminophen      Other reaction(s): vomiting    Iodine      Other reaction(s): stomach cramps    Shellfish containing products      Other reaction(s): stomach cramps     Past Medical History:   Diagnosis Date    Immune deficiency disorder      No past surgical history on file.  No family history on file.  Social History[1]  Review of Systems   Psychiatric/Behavioral:  Positive for behavioral problems and hallucinations. The patient is nervous/anxious.        Physical Exam     Initial Vitals [06/14/25 0630]   BP Pulse Resp Temp SpO2   117/73 95 18 97 °F (36.1 °C) 98 %      MAP       --         Physical Exam    Nursing note and vitals reviewed.  Constitutional: He appears well-developed. No distress.   HENT:   Head: Normocephalic and atraumatic. Mouth/Throat: Oropharynx is clear and moist. No oropharyngeal exudate.   Eyes: Conjunctivae and EOM are normal. Pupils are equal, round, and reactive to light.   Neck: Neck supple.   Normal range of motion.  Cardiovascular:  Regular rhythm, normal heart sounds and intact distal pulses.           Pulmonary/Chest: Breath sounds normal. No respiratory distress.   Abdominal: Abdomen is soft. Bowel sounds are normal. He exhibits no distension. There is " no abdominal tenderness. There is no rebound and no guarding.   Musculoskeletal:         General: No edema. Normal range of motion.      Cervical back: Normal range of motion and neck supple.     Neurological: He is alert and oriented to person, place, and time. GCS score is 15. GCS eye subscore is 4. GCS verbal subscore is 5. GCS motor subscore is 6.   Skin: Skin is warm and dry. No rash noted. No erythema.   Psychiatric: His speech is normal and behavior is normal. His mood appears anxious. He is actively hallucinating. Thought content is delusional. Cognition and memory are normal. He expresses impulsivity. He is attentive.         ED Course   Procedures  Labs Reviewed   DRUG SCREEN, URINE (BEAKER) - Abnormal       Result Value    Amphetamines, Urine Negative      Barbiturates, Urine Negative      Benzodiazepine, Urine Negative      Cannabinoids, Urine Negative      Cocaine, Urine Positive (*)     Fentanyl, Urine Negative      MDMA, Urine Negative      Opiates, Urine Negative      Phencyclidine, Urine Negative      pH, Urine 6.5      Specific Gravity, Urine Auto 1.020      Narrative:     Cut off concentrations:    Amphetamines - 1000 ng/ml  Barbiturates - 200 ng/ml  Benzodiazepine - 200 ng/ml  Cannabinoids (THC) - 50 ng/ml  Cocaine - 300 ng/ml  Fentanyl - 1.0 ng/ml  MDMA - 500 ng/ml  Opiates - 300 ng/ml   Phencyclidine (PCP) - 25 ng/ml    Specimen submitted for drug analysis and tested for pH and specific gravity in order to evaluate sample integrity. Suspect tampering if specific gravity is <1.003 and/or pH is not within the range of 4.5 - 8.0  False negatives may result form substances such as bleach added to urine.  False positives may result for the presence of a substance with similar chemical structure to the drug or its metabolite.    This test provides only a PRELIMINARY analytical test result. A more specific alternate chemical method must be used in order to obtain a confirmed analytical result. Gas  chromatography/mass spectrometry (GC/MS) is the preferred confirmatory method. Other chemical confirmation methods are available. Clinical consideration and professional judgement should be applied to any drug of abuse test result, particularly when preliminary positive results are used.    Positive results will be confirmed only at the physicians request. Unconfirmed screening results are to be used only for medical purposes (treatment).        COMPREHENSIVE METABOLIC PANEL - Abnormal    Sodium 136      Potassium 3.5      Chloride 105      CO2 25      Glucose 71 (*)     Blood Urea Nitrogen 15.0      Creatinine 1.14      Calcium 8.2 (*)     Protein Total 8.3      Albumin 1.6 (*)     Globulin 6.7 (*)     Albumin/Globulin Ratio 0.2 (*)     Bilirubin Total 0.2      ALP 44      ALT 11      AST 25      eGFR >60      Anion Gap 6.0      BUN/Creatinine Ratio 13     CBC WITH DIFFERENTIAL - Abnormal    WBC 8.22      RBC 2.99 (*)     Hgb 9.6 (*)     Hct 28.8 (*)     MCV 96.3 (*)     MCH 32.1 (*)     MCHC 33.3      RDW 13.6      Platelet 268      MPV 9.9      Neut % 78.9      Lymph % 8.9      Mono % 6.9      Eos % 4.6      Basophil % 0.2      Imm Grans % 0.5      Neut # 6.48      Lymph # 0.73      Mono # 0.57      Eos # 0.38      Baso # 0.02      Imm Gran # 0.04      NRBC% 0.0     ALCOHOL,MEDICAL (ETHANOL) - Normal    Ethanol Level <10.0     CBC W/ AUTO DIFFERENTIAL    Narrative:     The following orders were created for panel order CBC auto differential.  Procedure                               Abnormality         Status                     ---------                               -----------         ------                     CBC with Differential[[id number]]       Abnormal            Final result                 Please view results for these tests on the individual orders.   EXTRA TUBES    Narrative:     The following orders were created for panel order EXTRA TUBES.  Procedure                               Abnormality          Status                     ---------                               -----------         ------                     Light Blue Top Hold[[id number]]                             Final result               Red Top Hold[[id number]]                                    Final result               Light Green Top Hold[[id number]]                            Final result                 Please view results for these tests on the individual orders.   LIGHT BLUE TOP HOLD    Extra Tube Hold for add-ons.     RED TOP HOLD    Extra Tube Hold for add-ons.     LIGHT GREEN TOP HOLD    Extra Tube Hold for add-ons.            Imaging Results    None          Medications   QUEtiapine tablet 50 mg (50 mg Oral Given 6/14/25 0645)     Medical Decision Making  Amount and/or Complexity of Data Reviewed  Labs: ordered. Decision-making details documented in ED Course.  Discussion of management or test interpretation with external provider(s): 6:36 AM Consult: I discussed the case with Paulie counselor. Agrees with current management.   Recommends will evaluate in ED      Risk  Prescription drug management.               ED Course as of 06/16/25 0546   Sat Jun 14, 2025 0719 Patient does not want to go to rehab anymore.  I am going to let him go home. 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 [address] [phone]                     [1]   Social History  Tobacco Use    Smoking status: Every Day     Types: Cigarettes    Smokeless tobacco: Never        Oral Murillo MD  06/16/25 0546

## 2025-08-20 ENCOUNTER — HOSPITAL ENCOUNTER (EMERGENCY)
Facility: HOSPITAL | Age: 55
Discharge: HOME | End: 2025-08-20
Attending: EMERGENCY MEDICINE

## 2025-08-20 VITALS
HEIGHT: 71 IN | BODY MASS INDEX: 17.64 KG/M2 | TEMPERATURE: 98 F | HEART RATE: 50 BPM | RESPIRATION RATE: 13 BRPM | OXYGEN SATURATION: 100 % | WEIGHT: 126 LBS | SYSTOLIC BLOOD PRESSURE: 125 MMHG | DIASTOLIC BLOOD PRESSURE: 71 MMHG

## 2025-08-20 DIAGNOSIS — R07.9 CHEST PAIN: ICD-10-CM

## 2025-08-20 DIAGNOSIS — F14.90: Primary | ICD-10-CM

## 2025-08-20 LAB
ACCEPTIBLE SP GR UR QL: 1.01 (ref 1–1.03)
ALBUMIN SERPL-MCNC: 1.9 G/DL (ref 3.5–5)
ALBUMIN/GLOB SERPL: 0.3 RATIO (ref 1.1–2)
ALP SERPL-CCNC: 43 UNIT/L (ref 40–150)
ALT SERPL-CCNC: 13 UNIT/L (ref 0–55)
AMPHET UR QL SCN: NEGATIVE
ANION GAP SERPL CALC-SCNC: 6 MEQ/L
AST SERPL-CCNC: 22 UNIT/L (ref 11–45)
BACTERIA #/AREA URNS AUTO: ABNORMAL /HPF
BARBITURATE SCN PRESENT UR: NEGATIVE
BASOPHILS # BLD AUTO: 0.01 X10(3)/MCL
BASOPHILS NFR BLD AUTO: 0.2 %
BENZODIAZ UR QL SCN: NEGATIVE
BILIRUB SERPL-MCNC: 0.2 MG/DL
BILIRUB UR QL STRIP.AUTO: NEGATIVE
BUN SERPL-MCNC: 21.2 MG/DL (ref 8.4–25.7)
CALCIUM SERPL-MCNC: 8 MG/DL (ref 8.4–10.2)
CANNABINOIDS UR QL SCN: NEGATIVE
CHLORIDE SERPL-SCNC: 109 MMOL/L (ref 98–107)
CLARITY UR: CLEAR
CO2 SERPL-SCNC: 25 MMOL/L (ref 22–29)
COCAINE UR QL SCN: POSITIVE
COLOR UR AUTO: ABNORMAL
CREAT SERPL-MCNC: 0.99 MG/DL (ref 0.72–1.25)
CREAT/UREA NIT SERPL: 21
EOSINOPHIL # BLD AUTO: 0.62 X10(3)/MCL (ref 0–0.9)
EOSINOPHIL NFR BLD AUTO: 13.2 %
ERYTHROCYTE [DISTWIDTH] IN BLOOD BY AUTOMATED COUNT: 14.6 % (ref 11.5–17)
FENTANYL UR QL SCN: NEGATIVE
GFR SERPLBLD CREATININE-BSD FMLA CKD-EPI: >60 ML/MIN/1.73/M2
GLOBULIN SER-MCNC: 5.8 GM/DL (ref 2.4–3.5)
GLUCOSE SERPL-MCNC: 93 MG/DL (ref 74–100)
GLUCOSE UR QL STRIP: NORMAL
HCT VFR BLD AUTO: 29.7 % (ref 42–52)
HGB BLD-MCNC: 9.6 G/DL (ref 14–18)
HGB UR QL STRIP: ABNORMAL
HYALINE CASTS #/AREA URNS LPF: ABNORMAL /LPF
IMM GRANULOCYTES # BLD AUTO: 0.01 X10(3)/MCL (ref 0–0.04)
IMM GRANULOCYTES NFR BLD AUTO: 0.2 %
KETONES UR QL STRIP: NEGATIVE
LEUKOCYTE ESTERASE UR QL STRIP: 250
LYMPHOCYTES # BLD AUTO: 0.76 X10(3)/MCL (ref 0.6–4.6)
LYMPHOCYTES NFR BLD AUTO: 16.2 %
MCH RBC QN AUTO: 32.7 PG (ref 27–31)
MCHC RBC AUTO-ENTMCNC: 32.3 G/DL (ref 33–36)
MCV RBC AUTO: 101 FL (ref 80–94)
MDMA UR QL SCN: NEGATIVE
MONOCYTES # BLD AUTO: 0.41 X10(3)/MCL (ref 0.1–1.3)
MONOCYTES NFR BLD AUTO: 8.7 %
MUCOUS THREADS URNS QL MICRO: ABNORMAL /LPF
NEUTROPHILS # BLD AUTO: 2.88 X10(3)/MCL (ref 2.1–9.2)
NEUTROPHILS NFR BLD AUTO: 61.5 %
NITRITE UR QL STRIP: NEGATIVE
NRBC BLD AUTO-RTO: 0 %
NT-PROBNP SERPL-MCNC: 77 PG/ML
OHS QRS DURATION: 100 MS
OHS QTC CALCULATION: 484 MS
OPIATES UR QL SCN: NEGATIVE
PCP UR QL: NEGATIVE
PH UR STRIP: 6.5 [PH]
PH UR: 6.5 [PH] (ref 3–11)
PLATELET # BLD AUTO: 195 X10(3)/MCL (ref 130–400)
PMV BLD AUTO: 10.5 FL (ref 7.4–10.4)
POTASSIUM SERPL-SCNC: 3.5 MMOL/L (ref 3.5–5.1)
PROT SERPL-MCNC: 7.7 GM/DL (ref 6.4–8.3)
PROT UR QL STRIP: ABNORMAL
RBC # BLD AUTO: 2.94 X10(6)/MCL (ref 4.7–6.1)
RBC #/AREA URNS AUTO: ABNORMAL /HPF
SODIUM SERPL-SCNC: 140 MMOL/L (ref 136–145)
SP GR UR STRIP.AUTO: 1.01 (ref 1–1.03)
SQUAMOUS #/AREA URNS LPF: ABNORMAL /HPF
TROPONIN I SERPL HS-MCNC: 4 NG/L
TROPONIN I SERPL HS-MCNC: 5 NG/L
UROBILINOGEN UR STRIP-ACNC: NORMAL
WBC # BLD AUTO: 4.69 X10(3)/MCL (ref 4.5–11.5)
WBC #/AREA URNS AUTO: ABNORMAL /HPF
YEAST BUDDING URNS QL: ABNORMAL /HPF

## 2025-08-20 PROCEDURE — 80307 DRUG TEST PRSMV CHEM ANLYZR: CPT | Performed by: NURSE PRACTITIONER

## 2025-08-20 PROCEDURE — 99285 EMERGENCY DEPT VISIT HI MDM: CPT | Mod: 25

## 2025-08-20 PROCEDURE — 85025 COMPLETE CBC W/AUTO DIFF WBC: CPT | Performed by: NURSE PRACTITIONER

## 2025-08-20 PROCEDURE — 94761 N-INVAS EAR/PLS OXIMETRY MLT: CPT

## 2025-08-20 PROCEDURE — 81001 URINALYSIS AUTO W/SCOPE: CPT | Mod: XB | Performed by: NURSE PRACTITIONER

## 2025-08-20 PROCEDURE — 80053 COMPREHEN METABOLIC PANEL: CPT | Performed by: NURSE PRACTITIONER

## 2025-08-20 PROCEDURE — 83880 ASSAY OF NATRIURETIC PEPTIDE: CPT | Performed by: NURSE PRACTITIONER

## 2025-08-20 PROCEDURE — 84484 ASSAY OF TROPONIN QUANT: CPT | Performed by: EMERGENCY MEDICINE

## 2025-08-20 PROCEDURE — 84484 ASSAY OF TROPONIN QUANT: CPT | Performed by: NURSE PRACTITIONER

## 2025-08-20 PROCEDURE — 93005 ELECTROCARDIOGRAM TRACING: CPT
